# Patient Record
Sex: FEMALE | Race: WHITE | Employment: FULL TIME | URBAN - METROPOLITAN AREA
[De-identification: names, ages, dates, MRNs, and addresses within clinical notes are randomized per-mention and may not be internally consistent; named-entity substitution may affect disease eponyms.]

---

## 2017-04-07 ENCOUNTER — EMPLOYEE WELLNESS (OUTPATIENT)
Dept: OTHER | Age: 27
End: 2017-04-07

## 2017-04-07 LAB
CHOLESTEROL/HDL RATIO: 2.4
CHOLESTEROL: 180 MG/DL
GLUCOSE BLD-MCNC: 96 MG/DL (ref 70–99)
HDLC SERPL-MCNC: 76 MG/DL
LDL CHOLESTEROL: 95 MG/DL (ref 0–130)
PATIENT FASTING?: YES
TRIGL SERPL-MCNC: 46 MG/DL
VLDLC SERPL CALC-MCNC: NORMAL MG/DL (ref 1–30)

## 2017-07-13 ENCOUNTER — HOSPITAL ENCOUNTER (OUTPATIENT)
Age: 27
Setting detail: SPECIMEN
Discharge: HOME | End: 2017-07-13
Payer: COMMERCIAL

## 2017-07-16 LAB
CULTURE: ABNORMAL
DIRECT EXAM: ABNORMAL
Lab: ABNORMAL
ORGANISM: ABNORMAL
ORGANISM: ABNORMAL
SPECIMEN DESCRIPTION: ABNORMAL
STATUS: ABNORMAL

## 2018-03-20 VITALS — WEIGHT: 191 LBS

## 2018-05-14 ENCOUNTER — EMPLOYEE WELLNESS (OUTPATIENT)
Dept: OTHER | Age: 28
End: 2018-05-14

## 2018-05-14 LAB
CHOLESTEROL/HDL RATIO: 2.6
CHOLESTEROL: 153 MG/DL
GLUCOSE BLD-MCNC: 90 MG/DL (ref 70–99)
HDLC SERPL-MCNC: 60 MG/DL
LDL CHOLESTEROL: 79 MG/DL (ref 0–130)
PATIENT FASTING?: YES
TRIGL SERPL-MCNC: 68 MG/DL
VLDLC SERPL CALC-MCNC: NORMAL MG/DL (ref 1–30)

## 2018-05-21 VITALS — WEIGHT: 172 LBS

## 2018-07-03 ENCOUNTER — OFFICE VISIT (OUTPATIENT)
Dept: FAMILY MEDICINE CLINIC | Age: 28
End: 2018-07-03
Payer: COMMERCIAL

## 2018-07-03 VITALS
SYSTOLIC BLOOD PRESSURE: 104 MMHG | HEIGHT: 71 IN | BODY MASS INDEX: 24.22 KG/M2 | HEART RATE: 101 BPM | WEIGHT: 173 LBS | DIASTOLIC BLOOD PRESSURE: 64 MMHG | OXYGEN SATURATION: 98 % | TEMPERATURE: 98 F

## 2018-07-03 DIAGNOSIS — J02.0 STREP THROAT: Primary | ICD-10-CM

## 2018-07-03 DIAGNOSIS — J02.9 SORE THROAT: ICD-10-CM

## 2018-07-03 LAB — S PYO AG THROAT QL: POSITIVE

## 2018-07-03 PROCEDURE — G8420 CALC BMI NORM PARAMETERS: HCPCS | Performed by: NURSE PRACTITIONER

## 2018-07-03 PROCEDURE — 1036F TOBACCO NON-USER: CPT | Performed by: NURSE PRACTITIONER

## 2018-07-03 PROCEDURE — G8427 DOCREV CUR MEDS BY ELIG CLIN: HCPCS | Performed by: NURSE PRACTITIONER

## 2018-07-03 PROCEDURE — 99201 PR OFFICE OUTPATIENT NEW 10 MINUTES: CPT | Performed by: NURSE PRACTITIONER

## 2018-07-03 PROCEDURE — 87880 STREP A ASSAY W/OPTIC: CPT | Performed by: NURSE PRACTITIONER

## 2018-07-03 RX ORDER — AMOXICILLIN 500 MG/1
500 CAPSULE ORAL 3 TIMES DAILY
Qty: 30 CAPSULE | Refills: 0 | Status: SHIPPED | OUTPATIENT
Start: 2018-07-03 | End: 2019-05-09 | Stop reason: SDUPTHER

## 2018-07-03 ASSESSMENT — PATIENT HEALTH QUESTIONNAIRE - PHQ9
1. LITTLE INTEREST OR PLEASURE IN DOING THINGS: 0
SUM OF ALL RESPONSES TO PHQ QUESTIONS 1-9: 0
2. FEELING DOWN, DEPRESSED OR HOPELESS: 0
SUM OF ALL RESPONSES TO PHQ9 QUESTIONS 1 & 2: 0

## 2018-07-03 ASSESSMENT — ENCOUNTER SYMPTOMS
COUGH: 0
VOMITING: 0
NAUSEA: 0
DIARRHEA: 0
SWOLLEN GLANDS: 1
SORE THROAT: 1
ABDOMINAL PAIN: 0

## 2018-07-03 NOTE — PROGRESS NOTES
[facility]  [address] [address]  Dept: [phone]  Dept Fax: [phone]    Simone Coppola is a 34 y.o. female who presents to the urgent care today for her medical conditions/complaints as noted below. Simone Coppola is c/o of Pharyngitis (started yesterday with chills, sweats and ha)      HPI:     Sore throat yesterday  Headache, chills  Denies uri      Pharyngitis   This is a new problem. The current episode started yesterday. The problem occurs constantly. The problem has been waxing and waning. Associated symptoms include chills, headaches, a sore throat and swollen glands. Pertinent negatives include no abdominal pain, congestion, coughing, nausea, rash or vomiting. The symptoms are aggravated by swallowing. She has tried NSAIDs for the symptoms. The treatment provided mild relief. History reviewed. No pertinent past medical history. Current Outpatient Prescriptions   Medication Sig Dispense Refill    amoxicillin (AMOXIL) 500 MG capsule Take 1 capsule by mouth 3 times daily for 10 days 30 capsule 0     No current facility-administered medications for this visit. No Known Allergies    Subjective:      Review of Systems   Constitutional: Positive for chills. HENT: Positive for sore throat. Negative for congestion. Respiratory: Negative for cough. Gastrointestinal: Negative for abdominal pain, diarrhea, nausea and vomiting. Skin: Negative for rash. Neurological: Positive for headaches. All other systems reviewed and are negative. Objective:     Physical Exam   Constitutional: She is oriented to person, place, and time. She appears well-developed and well-nourished. No distress. HENT:   Head: Normocephalic and atraumatic. Right Ear: Tympanic membrane normal.   Left Ear: Tympanic membrane normal.   Nose: Nose normal.   Mouth/Throat: Mucous membranes are normal. No trismus in the jaw.  No uvula instructions. Discussed use, benefit, and side effects of prescribed medications. All patient questions answered. Pt voiced understanding.     Electronically signed by SONIA Corado CNP on 7/3/2018 at 6:07 PM

## 2018-09-17 ENCOUNTER — HOSPITAL ENCOUNTER (OUTPATIENT)
Age: 28
Setting detail: SPECIMEN
Discharge: HOME | End: 2018-09-17
Payer: COMMERCIAL

## 2018-09-17 ENCOUNTER — OFFICE VISIT (OUTPATIENT)
Dept: OBGYN CLINIC | Age: 28
End: 2018-09-17
Payer: COMMERCIAL

## 2018-09-17 VITALS
BODY MASS INDEX: 24.42 KG/M2 | SYSTOLIC BLOOD PRESSURE: 110 MMHG | HEIGHT: 71 IN | DIASTOLIC BLOOD PRESSURE: 72 MMHG | WEIGHT: 174.4 LBS

## 2018-09-17 DIAGNOSIS — Z01.419 ENCOUNTER FOR ANNUAL ROUTINE GYNECOLOGICAL EXAMINATION: Primary | ICD-10-CM

## 2018-09-17 PROCEDURE — 99385 PREV VISIT NEW AGE 18-39: CPT | Performed by: OBSTETRICS & GYNECOLOGY

## 2018-09-18 NOTE — PROGRESS NOTES
Discharge  Musculoskeletal ROS: No Arthralgia, Arthritis,Gout,Osteoporosis or Rheumatism  Neurological ROS: No CVA, Migraines, Epilepsy, Seizure Hx, or Limb Weakness  Dermatological ROS: No Rash, Itching, Hives, Mole Changes or Cancer                                                                                                                                                                                                                                  PHYSICAL Exam:     Constitutional:  Vitals:    09/17/18 1422   BP: 110/72   Site: Right Upper Arm   Position: Sitting   Cuff Size: Medium Adult   Weight: 174 lb 6.4 oz (79.1 kg)   Height: 5' 11\" (1.803 m)         General Appearance: This  is a well Developed, well Nourished, well groomed female. Her BMI was reviewed. Nutritional decision making was discussed. Skin:  There was a Normal Inspection of the skin without rashes or lesions. There were no rashes. Lymphatic:  No Lymph Nodes were Palpable in the neck , axilla or groin.  0 # Of Lymph Nodes    Neck and EENT:  The neck was supple. There were no masses   The thyroid was not enlarged and had no masses. EOMI B/L      Respiratory: The lungs were auscultated and found to be clear. There were no rales, rhonchi or wheezes. There was a good respiratory effort. Cardiovascular: The heart was in a regular rate and rhythm. . No S3 or S4. There was no murmur appreciated. Location, grade, and radiation are not applicable. Extremities: The patients extremities were without calf tenderness, edema, or varicosities. There was full range of motion in all four extremities. Pulses in all four extremities were appreciated and are 2/4. Abdomen: The abdomen was soft and non-tender. There were good bowel sounds in all quadrants and there was no guarding, rebound or rigidity. Abdominal Scars: none    Psych:   The patient had a normal Orientation to: Time, Place, Person, and Situation  There is no Mood / Affect changes    Breast:  (Chest)  normal appearance, no masses or tenderness  Self breast exams were reviewed in detail. Literature was given. Pelvic Exam:  Vulva and vagina appear normal. Bimanual exam reveals normal uterus and adnexa. Rectal Exam:  exam declined by patient          Musculosk:  Normal Gait and station was noted. Digits were evaluated without abnormal findings. Range of motion, stability and strength were evaluated and found to be appropriate for the patients age. OMM Structural Component:  The patient did not complain of a Chief complaint requiring OMM. Chief Complaint:none    Structural Exam: Refused    ASSESSMENT:      34 y.o. Annual   Diagnosis Orders   1. Encounter for annual routine gynecological examination  PAP Smear          Chief Complaint   Patient presents with    Gynecologic Exam          History reviewed. No pertinent past medical history. There are no active problems to display for this patient. Hereditary Breast, Ovarian, Colon and Uterine Cancer screening Done. Tobacco & Secondary smoke risks reviewed; instructed on cessation and avoidance      Counseling Completed:  Preventative Health Recommendations and Follow up. PLAN:  Return if symptoms worsen or fail to improve, for annual.  Repeat Annual every 1 year  Cervical Cytology Evaluation begins at 24years old. If Negative Cytology, Follow-up screening per current guidelines. Multivitamin and folic acid  Birth control and barrier recommendations discussed. STD counseling and prevention reviewed. Routine health maintenance per patients PCP. Orders Placed This Encounter   Procedures    PAP Smear     Patient History:    Patient's last menstrual period was 09/03/2018.   OBGYN Status: Having periods  Past Surgical History:  No date: WISDOM TOOTH EXTRACTION      Smoking status: Never Smoker                                                              Smokeless tobacco: Never Used Standing Status:   Future     Standing Expiration Date:   9/17/2019     Order Specific Question:   Collection Type     Answer: Thin Prep     Order Specific Question:   Prior Abnormal Pap Test     Answer:   No     Order Specific Question:   Screening or Diagnostic     Answer:   Screening     Order Specific Question:   HPV Requested?      Answer:   Yes - If Abnormal Reflex HPV     Order Specific Question:   High Risk Patient     Answer:   N/A

## 2018-10-01 LAB — CYTOLOGY REPORT: NORMAL

## 2019-05-09 ENCOUNTER — OFFICE VISIT (OUTPATIENT)
Dept: FAMILY MEDICINE CLINIC | Age: 29
End: 2019-05-09
Payer: COMMERCIAL

## 2019-05-09 VITALS
HEART RATE: 78 BPM | OXYGEN SATURATION: 99 % | TEMPERATURE: 98.3 F | SYSTOLIC BLOOD PRESSURE: 110 MMHG | DIASTOLIC BLOOD PRESSURE: 80 MMHG

## 2019-05-09 DIAGNOSIS — J02.9 SORE THROAT: ICD-10-CM

## 2019-05-09 DIAGNOSIS — R07.89 CHEST TIGHTNESS: ICD-10-CM

## 2019-05-09 DIAGNOSIS — J02.0 STREP THROAT: Primary | ICD-10-CM

## 2019-05-09 LAB — S PYO AG THROAT QL: POSITIVE

## 2019-05-09 PROCEDURE — G8420 CALC BMI NORM PARAMETERS: HCPCS | Performed by: NURSE PRACTITIONER

## 2019-05-09 PROCEDURE — 87880 STREP A ASSAY W/OPTIC: CPT | Performed by: NURSE PRACTITIONER

## 2019-05-09 PROCEDURE — 99213 OFFICE O/P EST LOW 20 MIN: CPT | Performed by: NURSE PRACTITIONER

## 2019-05-09 PROCEDURE — 1036F TOBACCO NON-USER: CPT | Performed by: NURSE PRACTITIONER

## 2019-05-09 PROCEDURE — G8427 DOCREV CUR MEDS BY ELIG CLIN: HCPCS | Performed by: NURSE PRACTITIONER

## 2019-05-09 RX ORDER — PREDNISONE 20 MG/1
40 TABLET ORAL DAILY
Qty: 10 TABLET | Refills: 0 | Status: SHIPPED | OUTPATIENT
Start: 2019-05-09 | End: 2019-05-14 | Stop reason: ALTCHOICE

## 2019-05-09 RX ORDER — AMOXICILLIN 500 MG/1
500 CAPSULE ORAL 3 TIMES DAILY
Qty: 30 CAPSULE | Refills: 0 | Status: SHIPPED | OUTPATIENT
Start: 2019-05-09 | End: 2019-05-14 | Stop reason: ALTCHOICE

## 2019-05-09 ASSESSMENT — ENCOUNTER SYMPTOMS
RHINORRHEA: 1
COUGH: 1
CHEST TIGHTNESS: 1
WHEEZING: 0
SORE THROAT: 1
SHORTNESS OF BREATH: 0
EYE DISCHARGE: 0
EYE REDNESS: 0
SINUS PRESSURE: 0
VOICE CHANGE: 0

## 2019-05-09 NOTE — PROGRESS NOTES
[facility]  [address] [address]  Dept: [phone]  Dept Fax: [phone]     Danya Leo is a 34 y.o. female who presents to the urgent care today for her medicalconditions/complaints as noted below. Danya Leo is c/o of Chest Congestion (sore throat, runny nose and intermittent fever - started on Tuesday night)    HPI:      Cough   This is a new problem. Episode onset: 2 days ago. The problem has been waxing and waning. The cough is non-productive. Associated symptoms include nasal congestion, rhinorrhea and a sore throat. Pertinent negatives include no chest pain, chills, ear pain, eye redness, fever, headaches, myalgias, postnasal drip, rash, shortness of breath or wheezing. Treatments tried: claritin. The treatment provided no relief. No past medical history on file. Current Outpatient Medications   Medication Sig Dispense Refill    predniSONE (DELTASONE) 20 MG tablet Take 2 tablets by mouth daily for 5 days 10 tablet 0    amoxicillin (AMOXIL) 500 MG capsule Take 1 capsule by mouth 3 times daily for 10 days 30 capsule 0     No current facility-administered medications for this visit. No Known Allergies    Reviewed PMH, SH, and FH with the patient and updated. Subjective:      Review of Systems   Constitutional: Negative for chills, fatigue and fever. HENT: Positive for congestion, rhinorrhea and sore throat. Negative for ear discharge, ear pain, postnasal drip, sinus pressure, sneezing and voice change. Eyes: Negative for discharge and redness. Respiratory: Positive for cough and chest tightness. Negative for shortness of breath and wheezing. Cardiovascular: Negative. Negative for chest pain. Musculoskeletal: Negative for myalgias. Skin: Negative for rash. Neurological: Negative for dizziness, weakness, light-headedness and headaches. Hematological: Negative for adenopathy.    All other systems reviewed and are negative. Objective:      Physical Exam   Constitutional: She appears well-developed and well-nourished. No distress. HENT:   Head: Normocephalic. Right Ear: Tympanic membrane and external ear normal.   Left Ear: Tympanic membrane and external ear normal.   Nose: Nose normal. Right sinus exhibits no maxillary sinus tenderness and no frontal sinus tenderness. Left sinus exhibits no maxillary sinus tenderness and no frontal sinus tenderness. Mouth/Throat: Posterior oropharyngeal erythema present. No oropharyngeal exudate. Eyes: Right eye exhibits no discharge. Left eye exhibits no discharge. Cardiovascular: Normal rate, regular rhythm and normal heart sounds. No murmur heard. Pulmonary/Chest: Effort normal and breath sounds normal. No respiratory distress. She has no wheezes. She has no rales. Lymphadenopathy:     She has cervical adenopathy. Skin: Skin is warm. No rash noted. She is not diaphoretic. Nursing note and vitals reviewed. /80 (Site: Right Upper Arm, Position: Sitting, Cuff Size: Medium Adult)   Pulse 78   Temp 98.3 °F (36.8 °C) (Oral)   LMP 04/10/2019 (Exact Date)   SpO2 99%     Results for orders placed or performed in visit on 05/09/19   POCT rapid strep A   Result Value Ref Range    Strep A Ag Positive (A) None Detected     Assessment:       Diagnosis Orders   1. Strep throat  amoxicillin (AMOXIL) 500 MG capsule   2. Sore throat  POCT rapid strep A    predniSONE (DELTASONE) 20 MG tablet   3. Chest tightness  predniSONE (DELTASONE) 20 MG tablet     Plan:      Patient instructed to complete entire antibiotic course. Prednisone sent to the pharmacy to help enable symptom relief. Tylenol as needed for fever/discomfort. Change toothbrush in 24 hours. Salt water gargles and throat lozenges if desired. Patient agreeable to treatment plan. Educational materials provided on AVS.  Follow up if symptoms do not improve/worsen.     Orders Placed This Encounter   Medications    predniSONE (DELTASONE) 20 MG tablet     Sig: Take 2 tablets by mouth daily for 5 days     Dispense:  10 tablet     Refill:  0    amoxicillin (AMOXIL) 500 MG capsule     Sig: Take 1 capsule by mouth 3 times daily for 10 days     Dispense:  30 capsule     Refill:  0        Patient given educational materials - see patient instructions. Discussed use, benefit, and side effects of prescribed medications. All patientquestions answered. Pt voiced understanding.     Electronically signed by SONIA Ruvalcaba CNP on 5/9/2019at 3:30 PM

## 2019-05-09 NOTE — PATIENT INSTRUCTIONS
Patient Education        Strep Throat: Care Instructions  Your Care Instructions    Strep throat is a bacterial infection that causes sudden, severe sore throat and fever. Strep throat, which is caused by bacteria called streptococcus, is treated with antibiotics. Sometimes a strep test is necessary to tell if the sore throat is caused by strep bacteria. Treatment can help ease symptoms and may prevent future problems. Follow-up care is a key part of your treatment and safety. Be sure to make and go to all appointments, and call your doctor if you are having problems. It's also a good idea to know your test results and keep a list of the medicines you take. How can you care for yourself at home? · Take your antibiotics as directed. Do not stop taking them just because you feel better. You need to take the full course of antibiotics. · Strep throat can spread to others until 24 hours after you begin taking antibiotics. During this time, you should avoid contact with other people at work or home, especially infants and children. Do not sneeze or cough on others, and wash your hands often. Keep your drinking glass and eating utensils separate from those of others, and wash these items well in hot, soapy water. · Gargle with warm salt water at least once each hour to help reduce swelling and make your throat feel better. Use 1 teaspoon of salt mixed in 8 fluid ounces of warm water. · Take an over-the-counter pain medication, such as acetaminophen (Tylenol), ibuprofen (Advil, Motrin), or naproxen (Aleve). Read and follow all instructions on the label. · Try an over-the-counter anesthetic throat spray or throat lozenges, which may help relieve throat pain. · Drink plenty of fluids. Fluids may help soothe an irritated throat. Hot fluids, such as tea or soup, may help your throat feel better. · Eat soft solids and drink plenty of clear liquids.  Flavored ice pops, ice cream, scrambled eggs, sherbet, and gelatin dessert (such as Jell-O) may also soothe the throat. · Get lots of rest.  · Do not smoke, and avoid secondhand smoke. If you need help quitting, talk to your doctor about stop-smoking programs and medicines. These can increase your chances of quitting for good. · Use a vaporizer or humidifier to add moisture to the air in your bedroom. Follow the directions for cleaning the machine. When should you call for help? Call your doctor now or seek immediate medical care if:    · You have a new or higher fever.     · You have a fever with a stiff neck or severe headache.     · You have new or worse trouble swallowing.     · Your sore throat gets much worse on one side.     · Your pain becomes much worse on one side of your throat.    Watch closely for changes in your health, and be sure to contact your doctor if:    · You are not getting better after 2 days (48 hours).     · You do not get better as expected. Where can you learn more? Go to https://[employer]. org and sign in to your [employer] account. Enter K625 in the FNZ box to learn more about \"Strep Throat: Care Instructions. \"     If you do not have an account, please click on the \"Sign Up Now\" link. Current as of: October 21, 2018  Content Version: 12.0  © 1808-3924 [employer], [employer]orporated. Care instructions adapted under license by [facility] ([facility]). If you have questions about a medical condition or this instruction, always ask your healthcare professional. [place] [address] any warranty or liability for your use of this information.

## 2019-05-14 ENCOUNTER — OFFICE VISIT (OUTPATIENT)
Dept: FAMILY MEDICINE CLINIC | Age: 29
End: 2019-05-14
Payer: COMMERCIAL

## 2019-05-14 VITALS
DIASTOLIC BLOOD PRESSURE: 70 MMHG | HEIGHT: 71 IN | BODY MASS INDEX: 25.03 KG/M2 | WEIGHT: 178.8 LBS | OXYGEN SATURATION: 98 % | HEART RATE: 62 BPM | SYSTOLIC BLOOD PRESSURE: 112 MMHG

## 2019-05-14 DIAGNOSIS — Z00.00 PHYSICAL EXAM: Primary | ICD-10-CM

## 2019-05-14 PROCEDURE — 99395 PREV VISIT EST AGE 18-39: CPT | Performed by: INTERNAL MEDICINE

## 2019-05-14 ASSESSMENT — ENCOUNTER SYMPTOMS
NAUSEA: 0
SHORTNESS OF BREATH: 0
CONSTIPATION: 0
CHEST TIGHTNESS: 0
COUGH: 0
BACK PAIN: 0
SORE THROAT: 0
ABDOMINAL PAIN: 0
TROUBLE SWALLOWING: 0
STRIDOR: 0
RECTAL PAIN: 0
VOICE CHANGE: 0
DIARRHEA: 0
CHOKING: 0
ANAL BLEEDING: 0

## 2019-05-14 ASSESSMENT — PATIENT HEALTH QUESTIONNAIRE - PHQ9
SUM OF ALL RESPONSES TO PHQ QUESTIONS 1-9: 0
SUM OF ALL RESPONSES TO PHQ9 QUESTIONS 1 & 2: 0
SUM OF ALL RESPONSES TO PHQ QUESTIONS 1-9: 0
2. FEELING DOWN, DEPRESSED OR HOPELESS: 0
1. LITTLE INTEREST OR PLEASURE IN DOING THINGS: 0

## 2019-05-14 NOTE — PROGRESS NOTES
Visit Information    Have you changed or started any medications since your last visit including any over-the-counter medicines, vitamins, or herbal medicines? no   Have you stopped taking any of your medications? Is so, why? -  no  Are you having any side effects from any of your medications? - no    Have you seen any other physician or provider since your last visit?  no   Have you had any other diagnostic tests since your last visit?  no   Have you been seen in the emergency room and/or had an admission in a hospital since we last saw you?  no   Have you had your routine dental cleaning in the past 6 months?  no     Do you have an active MyChart account? If no, what is the barrier?   Yes    Patient Care Team:  Neelima Galarza DO as PCP - General (Family Medicine)  SONIA Salinas - CNP as PCP - S Attributed Provider    Medical History Review  Past Medical, Family, and Social History reviewed and does contribute to the patient presenting condition    Health Maintenance   Topic Date Due    Varicella Vaccine (1 of 2 - 13+ 2-dose series) 05/23/2005    HPV vaccine (1 - Female 3-dose series) 05/23/2007    HIV screen  05/23/2007    DTaP/Tdap/Td vaccine (1 - Tdap) 05/23/2011    Cervical cancer screen  09/17/2021    Flu vaccine  Completed    Pneumococcal 0-64 years Vaccine  Aged Out

## 2019-05-14 NOTE — PROGRESS NOTES
[address] [facility] [address]  Dept: [phone]  Dept Fax: [phone]    Carlene Mercedes a 34 y.o. female who presents today for her medical conditions/complaints as notedbelow. Thuy Fernandez is c/o of Establish Care (Patient is here to establish care)      HPI:     Here to establish care   No chronic medical conditions    is nurse at [employer]     No medications daily   Is up to date on tetanus but does not know last one   Cannot pull up [facility] records in care everywhere   Last pcp was there   Last titer for vaccines were normal   Last lipid /cmp in 2018 was good   No real family hx per patient   Does not smoke   No occasional etoh  Up to date on pap , did have hpv vaccine at 17   No abnormal pap   Normal periods , not on birth control     Sleeps well at night         Social History     Tobacco Use    Smoking status: Never Smoker    Smokeless tobacco: Never Used   Substance Use Topics    Alcohol use: No      No current outpatient medications on file. No current facility-administered medications for this visit. No Known Allergies      Subjective:     Review of Systems   Constitutional: Negative for activity change, appetite change, chills, diaphoresis, fatigue, fever and unexpected weight change. HENT: Negative for sore throat, tinnitus, trouble swallowing and voice change. Eyes: Negative for visual disturbance. Respiratory: Negative for cough, choking, chest tightness, shortness of breath and stridor. Cardiovascular: Negative for chest pain and leg swelling. Gastrointestinal: Negative for abdominal pain, anal bleeding, constipation, diarrhea, nausea and rectal pain. Endocrine: Negative for cold intolerance and heat intolerance. Genitourinary: Negative for menstrual problem and pelvic pain. Musculoskeletal: Negative for arthralgias, back pain and gait problem. Skin: Negative for rash. Allergic/Immunologic: Negative for immunocompromised state. Neurological: Negative for dizziness and headaches. Hematological: Negative for adenopathy. Does not bruise/bleed easily. Psychiatric/Behavioral: Negative for confusion and sleep disturbance. The patient is not nervous/anxious. Objective:      Physical Exam   Constitutional: She is oriented to person, place, and time. She appears well-developed and well-nourished. Non-toxic appearance. She does not have a sickly appearance. She does not appear ill. No distress. She is not intubated. HENT:   Head: Normocephalic and atraumatic. Right Ear: External ear normal.   Left Ear: External ear normal.   Nose: Nose normal.   Mouth/Throat: Uvula is midline, oropharynx is clear and moist and mucous membranes are normal. No tonsillar exudate. Eyes: Pupils are equal, round, and reactive to light. EOM are normal.   Neck: Trachea normal, full passive range of motion without pain and phonation normal. Neck supple. No JVD present. Carotid bruit is not present. No thyromegaly present. Cardiovascular: Normal rate, regular rhythm, S1 normal, S2 normal, normal heart sounds and intact distal pulses. Occasional extrasystoles are present. PMI is not displaced. No murmur heard. Pulmonary/Chest: Effort normal and breath sounds normal. No accessory muscle usage or stridor. No apnea, no tachypnea and no bradypnea. She is not intubated. No respiratory distress. She has no decreased breath sounds. She has no wheezes. Abdominal: Soft. Bowel sounds are normal. There is no splenomegaly or hepatomegaly. There is no tenderness. Lymphadenopathy:     She has no cervical adenopathy. Neurological: She is alert and oriented to person, place, and time. No cranial nerve deficit. Skin: Skin is warm and dry. No rash noted. Psychiatric: She has a normal mood and affect.  Her speech is normal and behavior is normal. Judgment normal. Thought content is not delusional. Cognition and memory are normal. She expresses no suicidal plans and no homicidal plans. Nursing note and vitals reviewed. /70   Pulse 62   Ht 5' 11\" (1.803 m)   Wt 178 lb 12.8 oz (81.1 kg)   LMP 04/10/2019 (Exact Date)   SpO2 98%   BMI 24.94 kg/m²     Assessment:          Diagnosis Orders   1. Physical exam         Plan:      Return if symptoms worsen or fail to improve. No orders of the defined types were placed in this encounter. No orders of the defined types were placed in this encounter. Findings and/or pathophysiology discussedwith patient. Plan of treatment discussed. Chart was evaluated. Availablelab work, tests and notes were discussed. Health maintenance was discussed. Diet,exercise, reduction in weight and salt was discussed. Range of motion exerciseswere discussed. Discussed use, benefit, and side effects of prescribed medications. All patient questions answered. Pt voiced understanding. Instructed to continuecurrent medications, diet and exercise. Patient agreed with treatment plan. Followup as directed. Patient given educational materials - see patient instructions.     Electronicallysigned by Nereyda Miramontes DO on 5/14/2019 at 10:17 AM

## 2019-07-01 ENCOUNTER — TELEPHONE (OUTPATIENT)
Dept: OBGYN CLINIC | Age: 29
End: 2019-07-01

## 2019-07-11 ENCOUNTER — INITIAL PRENATAL (OUTPATIENT)
Dept: OBGYN CLINIC | Age: 29
End: 2019-07-11
Payer: COMMERCIAL

## 2019-07-11 ENCOUNTER — HOSPITAL ENCOUNTER (OUTPATIENT)
Age: 29
Setting detail: SPECIMEN
Discharge: HOME | End: 2019-07-11
Payer: COMMERCIAL

## 2019-07-11 VITALS — BODY MASS INDEX: 24.48 KG/M2 | SYSTOLIC BLOOD PRESSURE: 112 MMHG | DIASTOLIC BLOOD PRESSURE: 64 MMHG | WEIGHT: 175.5 LBS

## 2019-07-11 DIAGNOSIS — Z3A.08 8 WEEKS GESTATION OF PREGNANCY: ICD-10-CM

## 2019-07-11 DIAGNOSIS — Z34.91 NORMAL PREGNANCY IN FIRST TRIMESTER: ICD-10-CM

## 2019-07-11 DIAGNOSIS — Z34.91 NORMAL PREGNANCY IN FIRST TRIMESTER: Primary | ICD-10-CM

## 2019-07-11 LAB
-: ABNORMAL
AMORPHOUS: ABNORMAL
BACTERIA: ABNORMAL
BILIRUBIN URINE: NEGATIVE
CASTS UA: ABNORMAL /LPF (ref 0–2)
COLOR: YELLOW
COMMENT UA: ABNORMAL
CRYSTALS, UA: ABNORMAL /HPF
CRYSTALS, UA: ABNORMAL /HPF
DIRECT EXAM: NORMAL
EPITHELIAL CELLS UA: ABNORMAL /HPF (ref 0–5)
GLUCOSE URINE: NEGATIVE
KETONES, URINE: NEGATIVE
LEUKOCYTE ESTERASE, URINE: NEGATIVE
Lab: NORMAL
MUCUS: ABNORMAL
NITRITE, URINE: NEGATIVE
OTHER OBSERVATIONS UA: ABNORMAL
PH UA: 7 (ref 5–8)
PROTEIN UA: NEGATIVE
RBC UA: ABNORMAL /HPF (ref 0–2)
RENAL EPITHELIAL, UA: ABNORMAL /HPF
SPECIFIC GRAVITY UA: 1.03 (ref 1–1.03)
SPECIMEN DESCRIPTION: NORMAL
TRICHOMONAS: ABNORMAL
TURBIDITY: ABNORMAL
URINE HGB: NEGATIVE
UROBILINOGEN, URINE: NORMAL
WBC UA: ABNORMAL /HPF (ref 0–5)
YEAST: ABNORMAL

## 2019-07-11 PROCEDURE — 0500F INITIAL PRENATAL CARE VISIT: CPT | Performed by: NURSE PRACTITIONER

## 2019-07-11 NOTE — PATIENT INSTRUCTIONS
Patient Education      Patient Education      Patient Education      Patient Education     Patient Education     After Hours Number: You can call the office [phone]  or [phone]  Call if you have:  1. Bleeding like a period  2. Cramps or contractions greater than 2 hours  3. If you are leaking fluid  4. If you've a fever greater than 100°  5. If you feel as if baby is not moving  6. If you have continuous vomiting over 3-4 hours        Patient was counseled on weight gain in pregnancy with the following recommendation made based on her BMI:     Singletons:  BMI <18.5: 28-40 lbs weight gain  BMI 18.5-24.9: 25-35 lbs weight gain   BMI 25-29.9: 15-25 lbs weight gain  BMI >/= 30: 11-20 lbs weight gain    Up to 16 weeks around 1 pound a month  16-28 weeks- 2-4 pounds a month  >28 weeks - around 1 pound a week due to increased growth and blood volume      Twins:  BMI <18.5: no reccomendations  BMI 18.5-24.9: 37-45 lbs weight gain  BMI 25-29.9: 31-50 lbs weight gain  BMI >/= 30: 25-42 lbs weight gain    Exercise During Pregnancy: Care Instructions  Your Care Instructions    Exercise is good for healthy pregnant women. It can relieve back pain, swelling, and other discomforts of pregnancy. It also prepares your muscles for childbirth. And exercise can improve your energy level and help you sleep better. If your doctor recommends it, get more exercise. Walking is a good choice. Bit by bit, increase the amount you walk every day. Try for at least 30 minutes on most days of the week. But if you do not already exercise, be sure to talk with your doctor before you start a new exercise program. Try exercise classes for pregnant women. Doctors do not recommend contact sports during pregnancy. Follow-up care is a key part of your treatment and safety. Be sure to make and go to all appointments, and call your doctor if you are having problems.  It's also a good idea to know your test results and keep a list of the common positions is the cradle hold. One arm supports your baby, with his or her head in the bend of your elbow. Your open hand supports your baby's bottom or back. Your baby's belly lies against yours. ? If you had your baby by , or , try the football hold. This position keeps your baby off your belly. Tuck your baby under your arm, with his or her body along the side you will be feeding on. Support your baby's upper body with your arm. With that hand you can control your baby's head to bring his or her mouth to your breast.  ? Try different positions with each feeding. If you are having problems, ask for help from your doctor or a lactation consultant. · To get your baby to latch on:  ? Support and narrow your breast with one hand using a \"U hold,\" with your thumb on the outer side of your breast and your fingers on the inner side. You can also use a \"C hold,\" with all your fingers below the nipple and your thumb above it. Try the different holds to get the deepest latch for whichever breastfeeding position you use. Your other arm is behind your baby's back, with your hand supporting the base of the baby's head. Position your fingers and thumb to point toward your baby's ears. ? You can touch your baby's lower lip with your nipple to get your baby to open his or her mouth. Wait until your baby opens up really wide, like a big yawn. Then be sure to bring the baby quickly to your breast--not your breast to the baby. As you bring your baby toward your breast, use your other hand to support the breast and guide it into his or her mouth. ? Both the nipple and a large portion of the darker area around the nipple (areola) should be in the baby's mouth. The baby's lips should be flared outward, not folded in (inverted). ? Listen for a regular sucking and swallowing pattern while the baby is feeding.  If you cannot see or hear a swallowing pattern, watch the baby's ears, which will wiggle slightly when concerns. Where can you learn more? Go to [email or web address] and sign in to your [employer] account. Enter P492 in the [employer] box to learn more about \"Breastfeeding: Care Instructions. \"     If you do not have an account, please click on the \"Sign Up Now\" link. Current as of: September 5, 2018  Content Version: 12.0  © 4286-9380 [employer]. Care instructions adapted under license by [facility] ([facility]). If you have questions about a medical condition or this instruction, always ask your healthcare professional. [address] any warranty or liability for your use of this information. Learning About Birth Defects Testing  What is birth defects testing? Birth defects testing is done during pregnancy to look for possible problems with the baby (fetus). A birth defect may have only a minor impact on a child's life. Or it can have a major effect on quality or length of life. You and your doctor can choose from many tests. You may have no tests, one test, or many tests. Talking with a genetic counselor may help you make decisions about testing. The counselor is trained to help you understand these tests. He or she can also help you find resources for support. What are the types of tests? You may have a screening test or a diagnostic test. Or you may have both types of tests. Screening tests show the chance that a baby has a certain birth defect, such as Down syndrome, spina bifida, or trisomy 25. Diagnostic tests show if a baby has a certain birth defect. · Screening tests and when they are done:  ? Blood tests at 10 to 13 weeks (first trimester)  ? Cell free fetal DNA test at 10 weeks or later (first trimester)  ? Nuchal translucency test at 11 to 14 weeks (first trimester)  ? Triple or quad screening at 15 to 20 weeks (second trimester)  ?  Ultrasound at 18 to 20 weeks (second trimester)  · Diagnostic tests and when they are

## 2019-07-12 LAB
C TRACH DNA GENITAL QL NAA+PROBE: NEGATIVE
N. GONORRHOEAE DNA: NEGATIVE
SPECIMEN DESCRIPTION: NORMAL

## 2019-07-13 LAB
CULTURE: NO GROWTH
Lab: NORMAL
SPECIMEN DESCRIPTION: NORMAL

## 2019-07-14 LAB
CULTURE: NORMAL
Lab: NORMAL
SPECIMEN DESCRIPTION: NORMAL

## 2019-07-15 ENCOUNTER — HOSPITAL ENCOUNTER (OUTPATIENT)
Age: 29
Discharge: HOME | End: 2019-07-15
Payer: COMMERCIAL

## 2019-07-15 DIAGNOSIS — Z34.91 NORMAL PREGNANCY IN FIRST TRIMESTER: ICD-10-CM

## 2019-07-15 DIAGNOSIS — Z3A.08 8 WEEKS GESTATION OF PREGNANCY: ICD-10-CM

## 2019-07-15 LAB
ABO/RH: NORMAL
ABSOLUTE EOS #: 0.09 K/UL (ref 0–0.44)
ABSOLUTE IMMATURE GRANULOCYTE: 0.03 K/UL (ref 0–0.3)
ABSOLUTE LYMPH #: 1.86 K/UL (ref 1.1–3.7)
ABSOLUTE MONO #: 0.88 K/UL (ref 0.1–1.2)
ANTIBODY SCREEN: NEGATIVE
BASOPHILS # BLD: 1 % (ref 0–2)
BASOPHILS ABSOLUTE: 0.07 K/UL (ref 0–0.2)
DIFFERENTIAL TYPE: ABNORMAL
EOSINOPHILS RELATIVE PERCENT: 1 % (ref 1–4)
GLUCOSE BLD-MCNC: 101 MG/DL (ref 70–99)
HCG QUANTITATIVE: ABNORMAL IU/L
HCT VFR BLD CALC: 39.9 % (ref 36.3–47.1)
HEMOGLOBIN: 13.2 G/DL (ref 11.9–15.1)
HEPATITIS B SURFACE ANTIGEN: NONREACTIVE
HIV AG/AB: NONREACTIVE
IMMATURE GRANULOCYTES: 0 %
LYMPHOCYTES # BLD: 20 % (ref 24–43)
MCH RBC QN AUTO: 30.3 PG (ref 25.2–33.5)
MCHC RBC AUTO-ENTMCNC: 33.1 G/DL (ref 28.4–34.8)
MCV RBC AUTO: 91.7 FL (ref 82.6–102.9)
MONOCYTES # BLD: 9 % (ref 3–12)
NRBC AUTOMATED: 0 PER 100 WBC
PDW BLD-RTO: 12.5 % (ref 11.8–14.4)
PLATELET # BLD: 208 K/UL (ref 138–453)
PLATELET ESTIMATE: ABNORMAL
PMV BLD AUTO: 11.6 FL (ref 8.1–13.5)
RBC # BLD: 4.35 M/UL (ref 3.95–5.11)
RBC # BLD: ABNORMAL 10*6/UL
RUBV IGG SER QL: 79.2 IU/ML
SEG NEUTROPHILS: 69 % (ref 36–65)
SEGMENTED NEUTROPHILS ABSOLUTE COUNT: 6.39 K/UL (ref 1.5–8.1)
T. PALLIDUM, IGG: NONREACTIVE
TSH SERPL DL<=0.05 MIU/L-ACNC: 1.12 MIU/L (ref 0.3–5)
WBC # BLD: 9.3 K/UL (ref 3.5–11.3)
WBC # BLD: ABNORMAL 10*3/UL

## 2019-07-15 PROCEDURE — 36415 COLL VENOUS BLD VENIPUNCTURE: CPT

## 2019-07-15 PROCEDURE — 84702 CHORIONIC GONADOTROPIN TEST: CPT

## 2019-07-15 PROCEDURE — 81220 CFTR GENE COM VARIANTS: CPT

## 2019-07-15 PROCEDURE — 82947 ASSAY GLUCOSE BLOOD QUANT: CPT

## 2019-07-15 PROCEDURE — 87340 HEPATITIS B SURFACE AG IA: CPT

## 2019-07-15 PROCEDURE — 85025 COMPLETE CBC W/AUTO DIFF WBC: CPT

## 2019-07-15 PROCEDURE — 86901 BLOOD TYPING SEROLOGIC RH(D): CPT

## 2019-07-15 PROCEDURE — 86780 TREPONEMA PALLIDUM: CPT

## 2019-07-15 PROCEDURE — 87389 HIV-1 AG W/HIV-1&-2 AB AG IA: CPT

## 2019-07-15 PROCEDURE — 84443 ASSAY THYROID STIM HORMONE: CPT

## 2019-07-15 PROCEDURE — 86762 RUBELLA ANTIBODY: CPT

## 2019-07-15 PROCEDURE — 86850 RBC ANTIBODY SCREEN: CPT

## 2019-07-15 PROCEDURE — 86900 BLOOD TYPING SEROLOGIC ABO: CPT

## 2019-07-22 LAB — CYSTIC FIBROSIS: NORMAL

## 2019-08-09 ENCOUNTER — ROUTINE PRENATAL (OUTPATIENT)
Dept: OBGYN CLINIC | Age: 29
End: 2019-08-09

## 2019-08-09 VITALS — DIASTOLIC BLOOD PRESSURE: 62 MMHG | BODY MASS INDEX: 23.71 KG/M2 | SYSTOLIC BLOOD PRESSURE: 110 MMHG | WEIGHT: 170 LBS

## 2019-08-09 DIAGNOSIS — Z34.91 NORMAL PREGNANCY IN FIRST TRIMESTER: Primary | ICD-10-CM

## 2019-08-09 DIAGNOSIS — Z3A.12 12 WEEKS GESTATION OF PREGNANCY: ICD-10-CM

## 2019-08-09 PROCEDURE — 0502F SUBSEQUENT PRENATAL CARE: CPT | Performed by: OBSTETRICS & GYNECOLOGY

## 2019-08-14 ENCOUNTER — PATIENT MESSAGE (OUTPATIENT)
Dept: OBGYN CLINIC | Age: 29
End: 2019-08-14

## 2019-08-14 ENCOUNTER — HOSPITAL ENCOUNTER (OUTPATIENT)
Age: 29
Discharge: HOME | End: 2019-08-14
Payer: COMMERCIAL

## 2019-08-14 DIAGNOSIS — R30.0 DYSURIA: Primary | ICD-10-CM

## 2019-08-14 PROCEDURE — 36415 COLL VENOUS BLD VENIPUNCTURE: CPT

## 2019-08-14 PROCEDURE — 87077 CULTURE AEROBIC IDENTIFY: CPT

## 2019-08-14 PROCEDURE — 81001 URINALYSIS AUTO W/SCOPE: CPT

## 2019-08-14 PROCEDURE — 87086 URINE CULTURE/COLONY COUNT: CPT

## 2019-08-14 PROCEDURE — 87186 SC STD MICRODIL/AGAR DIL: CPT

## 2019-08-15 LAB
-: ABNORMAL
AMORPHOUS: ABNORMAL
BACTERIA: ABNORMAL
BILIRUBIN URINE: NEGATIVE
CASTS UA: ABNORMAL /LPF (ref 0–8)
COLOR: YELLOW
COMMENT UA: ABNORMAL
CRYSTALS, UA: ABNORMAL /HPF
EPITHELIAL CELLS UA: ABNORMAL /HPF (ref 0–5)
GLUCOSE URINE: NEGATIVE
KETONES, URINE: NEGATIVE
LEUKOCYTE ESTERASE, URINE: ABNORMAL
MUCUS: ABNORMAL
NITRITE, URINE: NEGATIVE
OTHER OBSERVATIONS UA: ABNORMAL
PH UA: 5.5 (ref 5–8)
PROTEIN UA: NEGATIVE
RBC UA: ABNORMAL /HPF (ref 0–4)
RENAL EPITHELIAL, UA: ABNORMAL /HPF
SPECIFIC GRAVITY UA: 1.01 (ref 1–1.03)
TRICHOMONAS: ABNORMAL
TURBIDITY: CLEAR
URINE HGB: NEGATIVE
UROBILINOGEN, URINE: NORMAL
WBC UA: ABNORMAL /HPF (ref 0–5)
YEAST: ABNORMAL

## 2019-08-16 LAB
CULTURE: ABNORMAL
Lab: ABNORMAL
SPECIMEN DESCRIPTION: ABNORMAL

## 2019-08-22 ENCOUNTER — PATIENT MESSAGE (OUTPATIENT)
Dept: OBGYN CLINIC | Age: 29
End: 2019-08-22

## 2019-08-22 RX ORDER — FLUCONAZOLE 150 MG/1
150 TABLET ORAL ONCE
Qty: 1 TABLET | Refills: 0 | Status: SHIPPED | OUTPATIENT
Start: 2019-08-22 | End: 2019-08-22

## 2019-09-03 ENCOUNTER — ROUTINE PRENATAL (OUTPATIENT)
Dept: OBGYN CLINIC | Age: 29
End: 2019-09-03

## 2019-09-03 VITALS — SYSTOLIC BLOOD PRESSURE: 110 MMHG | DIASTOLIC BLOOD PRESSURE: 62 MMHG | BODY MASS INDEX: 23.71 KG/M2 | WEIGHT: 170 LBS

## 2019-09-03 DIAGNOSIS — Z34.92 NORMAL PREGNANCY IN SECOND TRIMESTER: Primary | ICD-10-CM

## 2019-09-03 DIAGNOSIS — Z3A.16 16 WEEKS GESTATION OF PREGNANCY: ICD-10-CM

## 2019-09-03 DIAGNOSIS — O23.42 URINARY TRACT INFECTION IN MOTHER DURING SECOND TRIMESTER OF PREGNANCY: ICD-10-CM

## 2019-09-03 PROCEDURE — 0502F SUBSEQUENT PRENATAL CARE: CPT | Performed by: ADVANCED PRACTICE MIDWIFE

## 2019-09-04 ENCOUNTER — HOSPITAL ENCOUNTER (OUTPATIENT)
Age: 29
Setting detail: SPECIMEN
Discharge: HOME | End: 2019-09-04
Payer: COMMERCIAL

## 2019-09-04 DIAGNOSIS — Z3A.16 16 WEEKS GESTATION OF PREGNANCY: ICD-10-CM

## 2019-09-04 DIAGNOSIS — O23.42 URINARY TRACT INFECTION IN MOTHER DURING SECOND TRIMESTER OF PREGNANCY: ICD-10-CM

## 2019-09-06 DIAGNOSIS — O23.42 RECURRENT URINARY TRACT INFECTION AFFECTING PREGNANCY IN SECOND TRIMESTER: ICD-10-CM

## 2019-09-06 DIAGNOSIS — O23.42 URINARY TRACT INFECTION IN MOTHER DURING SECOND TRIMESTER OF PREGNANCY: Primary | ICD-10-CM

## 2019-09-06 LAB
CULTURE: ABNORMAL
Lab: ABNORMAL
SPECIMEN DESCRIPTION: ABNORMAL

## 2019-09-06 RX ORDER — NITROFURANTOIN 25; 75 MG/1; MG/1
100 CAPSULE ORAL 2 TIMES DAILY
Qty: 14 CAPSULE | Refills: 0 | Status: SHIPPED | OUTPATIENT
Start: 2019-09-06 | End: 2019-09-13

## 2019-09-06 RX ORDER — NITROFURANTOIN 25; 75 MG/1; MG/1
100 CAPSULE ORAL NIGHTLY
Qty: 30 CAPSULE | Refills: 2 | Status: SHIPPED | OUTPATIENT
Start: 2019-09-06 | End: 2019-10-13 | Stop reason: SDUPTHER

## 2019-09-06 NOTE — PROGRESS NOTES
Called to inform patient of UTI. Will treat with Macrobid then place on suppression 100mg nightly. Will need rpt Urine culture at Next appt.  Pt states understanding

## 2019-10-01 ENCOUNTER — ROUTINE PRENATAL (OUTPATIENT)
Dept: PERINATAL CARE | Age: 29
End: 2019-10-01
Payer: COMMERCIAL

## 2019-10-01 VITALS
BODY MASS INDEX: 24.64 KG/M2 | WEIGHT: 176 LBS | HEART RATE: 81 BPM | SYSTOLIC BLOOD PRESSURE: 115 MMHG | DIASTOLIC BLOOD PRESSURE: 64 MMHG | TEMPERATURE: 97.9 F | HEIGHT: 71 IN | RESPIRATION RATE: 16 BRPM

## 2019-10-01 DIAGNOSIS — Z3A.20 20 WEEKS GESTATION OF PREGNANCY: ICD-10-CM

## 2019-10-01 DIAGNOSIS — Z13.89 ENCOUNTER FOR ROUTINE SCREENING FOR MALFORMATION USING ULTRASONICS: Primary | ICD-10-CM

## 2019-10-01 DIAGNOSIS — Z36.86 ENCOUNTER FOR SCREENING FOR RISK OF PRE-TERM LABOR: ICD-10-CM

## 2019-10-01 PROCEDURE — 76817 TRANSVAGINAL US OBSTETRIC: CPT | Performed by: OBSTETRICS & GYNECOLOGY

## 2019-10-01 PROCEDURE — 76805 OB US >/= 14 WKS SNGL FETUS: CPT | Performed by: OBSTETRICS & GYNECOLOGY

## 2019-10-08 ENCOUNTER — HOSPITAL ENCOUNTER (OUTPATIENT)
Age: 29
Setting detail: SPECIMEN
Discharge: HOME | End: 2019-10-08
Payer: COMMERCIAL

## 2019-10-08 ENCOUNTER — ROUTINE PRENATAL (OUTPATIENT)
Dept: OBGYN CLINIC | Age: 29
End: 2019-10-08

## 2019-10-08 VITALS — BODY MASS INDEX: 24.7 KG/M2 | DIASTOLIC BLOOD PRESSURE: 62 MMHG | SYSTOLIC BLOOD PRESSURE: 112 MMHG | WEIGHT: 177.13 LBS

## 2019-10-08 DIAGNOSIS — O23.42 URINARY TRACT INFECTION IN MOTHER DURING SECOND TRIMESTER OF PREGNANCY: ICD-10-CM

## 2019-10-08 DIAGNOSIS — Z34.02 ENCOUNTER FOR SUPERVISION OF NORMAL FIRST PREGNANCY IN SECOND TRIMESTER: Primary | ICD-10-CM

## 2019-10-08 DIAGNOSIS — Z3A.21 21 WEEKS GESTATION OF PREGNANCY: ICD-10-CM

## 2019-10-08 PROCEDURE — 0502F SUBSEQUENT PRENATAL CARE: CPT | Performed by: ADVANCED PRACTICE MIDWIFE

## 2019-10-10 LAB
CULTURE: NORMAL
Lab: NORMAL
SPECIMEN DESCRIPTION: NORMAL

## 2019-10-13 DIAGNOSIS — O23.42 RECURRENT URINARY TRACT INFECTION AFFECTING PREGNANCY IN SECOND TRIMESTER: ICD-10-CM

## 2019-10-14 RX ORDER — NITROFURANTOIN 25; 75 MG/1; MG/1
100 CAPSULE ORAL NIGHTLY
Qty: 30 CAPSULE | Refills: 2 | Status: SHIPPED | OUTPATIENT
Start: 2019-10-14 | End: 2019-11-25 | Stop reason: SDUPTHER

## 2019-11-04 ENCOUNTER — ROUTINE PRENATAL (OUTPATIENT)
Dept: OBGYN CLINIC | Age: 29
End: 2019-11-04

## 2019-11-04 VITALS — SYSTOLIC BLOOD PRESSURE: 122 MMHG | DIASTOLIC BLOOD PRESSURE: 72 MMHG | BODY MASS INDEX: 25.38 KG/M2 | WEIGHT: 182 LBS

## 2019-11-04 DIAGNOSIS — Z3A.25 25 WEEKS GESTATION OF PREGNANCY: Primary | ICD-10-CM

## 2019-11-04 PROCEDURE — 0502F SUBSEQUENT PRENATAL CARE: CPT | Performed by: OBSTETRICS & GYNECOLOGY

## 2019-11-25 DIAGNOSIS — O23.42 RECURRENT URINARY TRACT INFECTION AFFECTING PREGNANCY IN SECOND TRIMESTER: ICD-10-CM

## 2019-11-25 RX ORDER — NITROFURANTOIN 25; 75 MG/1; MG/1
100 CAPSULE ORAL NIGHTLY
Qty: 30 CAPSULE | Refills: 2 | Status: SHIPPED | OUTPATIENT
Start: 2019-11-25 | End: 2019-12-29 | Stop reason: SDUPTHER

## 2019-11-26 ENCOUNTER — HOSPITAL ENCOUNTER (OUTPATIENT)
Age: 29
Discharge: HOME | End: 2019-11-26
Payer: COMMERCIAL

## 2019-11-26 DIAGNOSIS — Z3A.25 25 WEEKS GESTATION OF PREGNANCY: ICD-10-CM

## 2019-11-26 LAB
BILIRUBIN URINE: NEGATIVE
COLOR: YELLOW
COMMENT UA: NORMAL
GLUCOSE ADMINISTRATION: NORMAL
GLUCOSE TOLERANCE SCREEN 50G: 114 MG/DL (ref 70–135)
GLUCOSE URINE: NEGATIVE
KETONES, URINE: NEGATIVE
LEUKOCYTE ESTERASE, URINE: NEGATIVE
NITRITE, URINE: NEGATIVE
PH UA: 7.5 (ref 5–8)
PROTEIN UA: NEGATIVE
SPECIFIC GRAVITY UA: 1.01 (ref 1–1.03)
TURBIDITY: CLEAR
URINE HGB: NEGATIVE
UROBILINOGEN, URINE: NORMAL

## 2019-11-26 PROCEDURE — 82950 GLUCOSE TEST: CPT

## 2019-11-26 PROCEDURE — 81003 URINALYSIS AUTO W/O SCOPE: CPT

## 2019-11-26 PROCEDURE — 36415 COLL VENOUS BLD VENIPUNCTURE: CPT

## 2019-11-26 PROCEDURE — 87086 URINE CULTURE/COLONY COUNT: CPT

## 2019-11-27 LAB
CULTURE: NO GROWTH
Lab: NORMAL
SPECIMEN DESCRIPTION: NORMAL

## 2019-12-04 ENCOUNTER — ROUTINE PRENATAL (OUTPATIENT)
Dept: OBGYN CLINIC | Age: 29
End: 2019-12-04
Payer: COMMERCIAL

## 2019-12-04 VITALS — SYSTOLIC BLOOD PRESSURE: 122 MMHG | BODY MASS INDEX: 25.52 KG/M2 | DIASTOLIC BLOOD PRESSURE: 72 MMHG | WEIGHT: 183 LBS

## 2019-12-04 DIAGNOSIS — Z34.92 NORMAL PREGNANCY IN SECOND TRIMESTER: Primary | ICD-10-CM

## 2019-12-04 DIAGNOSIS — Z3A.29 29 WEEKS GESTATION OF PREGNANCY: ICD-10-CM

## 2019-12-04 DIAGNOSIS — Z23 NEED FOR VACCINATION: ICD-10-CM

## 2019-12-04 PROCEDURE — 90471 IMMUNIZATION ADMIN: CPT | Performed by: NURSE PRACTITIONER

## 2019-12-04 PROCEDURE — 90715 TDAP VACCINE 7 YRS/> IM: CPT | Performed by: NURSE PRACTITIONER

## 2019-12-04 PROCEDURE — 0502F SUBSEQUENT PRENATAL CARE: CPT | Performed by: NURSE PRACTITIONER

## 2019-12-26 ENCOUNTER — ROUTINE PRENATAL (OUTPATIENT)
Dept: OBGYN CLINIC | Age: 29
End: 2019-12-26

## 2019-12-26 VITALS — BODY MASS INDEX: 26.5 KG/M2 | SYSTOLIC BLOOD PRESSURE: 118 MMHG | DIASTOLIC BLOOD PRESSURE: 62 MMHG | WEIGHT: 190 LBS

## 2019-12-26 DIAGNOSIS — Z34.92 NORMAL PREGNANCY IN SECOND TRIMESTER: Primary | ICD-10-CM

## 2019-12-26 PROCEDURE — 0502F SUBSEQUENT PRENATAL CARE: CPT | Performed by: OBSTETRICS & GYNECOLOGY

## 2019-12-26 RX ORDER — BREAST PUMP
EACH MISCELLANEOUS
Qty: 1 EACH | Refills: 0 | Status: SHIPPED | OUTPATIENT
Start: 2019-12-26

## 2019-12-29 DIAGNOSIS — O23.42 RECURRENT URINARY TRACT INFECTION AFFECTING PREGNANCY IN SECOND TRIMESTER: ICD-10-CM

## 2019-12-31 RX ORDER — NITROFURANTOIN 25; 75 MG/1; MG/1
100 CAPSULE ORAL NIGHTLY
Qty: 30 CAPSULE | Refills: 2 | Status: SHIPPED | OUTPATIENT
Start: 2019-12-31 | End: 2020-07-29

## 2020-01-06 ENCOUNTER — OFFICE VISIT (OUTPATIENT)
Dept: PRIMARY CARE CLINIC | Age: 30
End: 2020-01-06
Payer: COMMERCIAL

## 2020-01-06 VITALS
WEIGHT: 191 LBS | TEMPERATURE: 97.3 F | HEART RATE: 83 BPM | DIASTOLIC BLOOD PRESSURE: 73 MMHG | BODY MASS INDEX: 26.64 KG/M2 | SYSTOLIC BLOOD PRESSURE: 117 MMHG

## 2020-01-06 LAB
INFLUENZA A ANTIBODY: NORMAL
INFLUENZA B ANTIBODY: NORMAL
S PYO AG THROAT QL: NORMAL

## 2020-01-06 PROCEDURE — 99202 OFFICE O/P NEW SF 15 MIN: CPT | Performed by: INTERNAL MEDICINE

## 2020-01-06 PROCEDURE — 87880 STREP A ASSAY W/OPTIC: CPT | Performed by: INTERNAL MEDICINE

## 2020-01-06 PROCEDURE — 87804 INFLUENZA ASSAY W/OPTIC: CPT | Performed by: INTERNAL MEDICINE

## 2020-01-06 ASSESSMENT — PATIENT HEALTH QUESTIONNAIRE - PHQ9
SUM OF ALL RESPONSES TO PHQ QUESTIONS 1-9: 0
2. FEELING DOWN, DEPRESSED OR HOPELESS: 0
1. LITTLE INTEREST OR PLEASURE IN DOING THINGS: 0
SUM OF ALL RESPONSES TO PHQ9 QUESTIONS 1 & 2: 0
SUM OF ALL RESPONSES TO PHQ QUESTIONS 1-9: 0

## 2020-01-06 ASSESSMENT — ENCOUNTER SYMPTOMS: COUGH: 1

## 2020-01-10 ENCOUNTER — ROUTINE PRENATAL (OUTPATIENT)
Dept: OBGYN CLINIC | Age: 30
End: 2020-01-10

## 2020-01-10 VITALS — SYSTOLIC BLOOD PRESSURE: 126 MMHG | DIASTOLIC BLOOD PRESSURE: 72 MMHG | WEIGHT: 190 LBS | BODY MASS INDEX: 26.5 KG/M2

## 2020-01-10 PROCEDURE — 0502F SUBSEQUENT PRENATAL CARE: CPT | Performed by: OBSTETRICS & GYNECOLOGY

## 2020-01-10 NOTE — PROGRESS NOTES
Tian Sosa is a 34 y.o. female 34w6d        OB History    Para Term  AB Living   1 0 0 0 0 0   SAB TAB Ectopic Molar Multiple Live Births   0 0 0 0 0 0      # Outcome Date GA Lbr Galindo/2nd Weight Sex Delivery Anes PTL Lv   1 Current                Vitals  BP: 126/72  Weight: 190 lb (86.2 kg)  Patient Position: Sitting  Albumin: Negative  Glucose: Negative      The patient was seen and evaluated. There was positive fetal movements. No contractions or leakage of fluid. Signs and symptoms of  labor as well as labor were reviewed. The S/S of Pre-Eclampsia were reviewed with the patient in detail. She is to report any of these if they occur. She currently denies any of these. The patient had her 28 week labs completed. 19 Declined 1st Trimester Screen-TS  9/3/19 Declined Quad Screen-TS  [facility] for delivery  FOB - Jeanmarie Hernandez  Gender is a SURPRISE  TDAP given 19-sc      T-Dap Vaccine (27-36 weeks): completed    The patient was instructed on fetal kick counts and was given a kick sheet to complete every 8 hours. She was instructed that the baby should move at a minimum of ten times within one hour after a meal. The patient was instructed to lay down on her left side twenty minutes after eating and count movements for up to one hour with a target value of ten movements. She was instructed to notify the office if she did not make that target after two attempts or if after any attempt there was less than four movements. The patient reports that the targets have been made Yes.  Testing:  none    Assessment:  1. Tian Sosa is a 34 y.o. female  2.    3. 34w6d    Patient Active Problem List    Diagnosis Date Noted    Normal pregnancy in second trimester 2019     Declines genetics  Anatomy [facility] as clinically indicated      Urinary tract infection in mother during second trimester of pregnancy 09/03/2019     x2 rpt UC ordered  tx

## 2020-01-23 ENCOUNTER — ROUTINE PRENATAL (OUTPATIENT)
Dept: OBGYN CLINIC | Age: 30
End: 2020-01-23

## 2020-01-23 VITALS — BODY MASS INDEX: 26.92 KG/M2 | SYSTOLIC BLOOD PRESSURE: 120 MMHG | DIASTOLIC BLOOD PRESSURE: 72 MMHG | WEIGHT: 193 LBS

## 2020-01-23 PROCEDURE — 0502F SUBSEQUENT PRENATAL CARE: CPT | Performed by: OBSTETRICS & GYNECOLOGY

## 2020-01-23 NOTE — PROGRESS NOTES
]  Sensitivities for clindamycin and erythromycin were ordered. No      The patient was counseled on the mandatory call ahead policy. She has been instructed to call the office at anytime prior to going into the hospital so the on-call physician may direct her to the appropriate facility for care. Exceptions were reviewed including but not limited to: Decreased fetal movement, vaginal Bleeding or hemorrhage, trauma, readily expectant delivery, or any instance where she feels 911 should be utilized. The patient was counseled on Labor & Delivery. Route of delivery and counseling on vaginal, operative vaginal, and  sections were completed with the risks of each to both the patient as well as her baby. The possibility of a blood transfusion was discussed as well. The patient was not opposed to receiving a transfusion if needed. The patient was counseled on types of analgesia during labor and is considering either Regional or IV medication the risks, benefits and alternatives were discussed.  Testing:  none      Assessment:  1. Tejas Gee is a 34 y.o. female  2.   3. 36w5d      Patient Active Problem List    Diagnosis Date Noted    Normal pregnancy in second trimester 2019     Overview Note:     Declines genetics  Anatomy scan WNL follow up at [facility] as clinically indicated      Urinary tract infection in mother during second trimester of pregnancy 2019     Overview Note:     x2 rpt UC ordered  tx sent 19 discussed with Victor Manuel scanlon with macrobid and suppression therapy started   WIll need rpt UC at [facility] [place] in October   10/8 UC neg          Diagnosis Orders   1. Normal pregnancy in third trimester     2. 36 weeks gestation of pregnancy       Plan:  The patient will return to the office for her next visit in 1 weeks. See antepartum flow sheet. Counseled on s/s of preeclampsia. Counseled on s/s of pretem labor.   [address] discussed  GBS at next visit

## 2020-02-03 ENCOUNTER — HOSPITAL ENCOUNTER (OUTPATIENT)
Age: 30
Setting detail: SPECIMEN
Discharge: HOME | End: 2020-02-03
Payer: COMMERCIAL

## 2020-02-03 ENCOUNTER — ROUTINE PRENATAL (OUTPATIENT)
Dept: OBGYN CLINIC | Age: 30
End: 2020-02-03

## 2020-02-03 VITALS — SYSTOLIC BLOOD PRESSURE: 122 MMHG | BODY MASS INDEX: 26.92 KG/M2 | DIASTOLIC BLOOD PRESSURE: 64 MMHG | WEIGHT: 193 LBS

## 2020-02-03 PROCEDURE — 0502F SUBSEQUENT PRENATAL CARE: CPT | Performed by: OBSTETRICS & GYNECOLOGY

## 2020-02-03 NOTE — PROGRESS NOTES
Exam was:   Pt declined    The patient was counseled on the mandatory call ahead policy. She has been instructed to call the office at anytime prior to going into the hospital so the on-call physician may direct her to the appropriate facility for care. Exceptions were reviewed including but not limited to: Decreased fetal movement, vaginal Bleeding or hemorrhage, trauma, readily expectant delivery, or any instance where she feels 911 should be utilized. The patient was counseled on Labor & Delivery. Route of delivery and counseling on vaginal, operative vaginal, and  sections were completed with the risks of each to both the patient as well as her baby. The possibility of a blood transfusion was discussed as well. The patient was not opposed to receiving a transfusion if needed. The patient was counseled on types of analgesia during labor and is considering either Regional or IV medication the risks, benefits and alternatives were discussed.  Testing:  none    Assessment:  1. Mary Roger is a 34 y.o. female  2.   3. 38w2d    Patient Active Problem List    Diagnosis Date Noted    Normal pregnancy in second trimester 2019     Overview Note:     Declines genetics  Anatomy scan [facility]NL [facility] at [facility] as clinically indicated      Urinary tract infection in mother during second trimester of pregnancy 2019     Overview Note:     x2 rpt UC ordered  tx sent 19 discussed with Victor Manuel scanlon with macrobid and suppression therapy started   WIll need rpt UC at [facility] in October   10/8 UC neg          Diagnosis Orders   1. Normal pregnancy in third trimester  Strep B Screen, Vaginal / Rectal   2. 38 weeks gestation of pregnancy  Strep B Screen, Vaginal / Rectal     Plan:  The patient will return to the office for her next visit in 1 weeks. See antepartum flow sheet. Pt counseled on s/s of preeclampsia. Counseled on s/s of  labor. [address] discussed in detail.

## 2020-02-06 LAB
CULTURE: NORMAL
Lab: NORMAL
SPECIMEN DESCRIPTION: NORMAL

## 2020-02-13 ENCOUNTER — ROUTINE PRENATAL (OUTPATIENT)
Dept: OBGYN CLINIC | Age: 30
End: 2020-02-13

## 2020-02-13 VITALS — WEIGHT: 197 LBS | BODY MASS INDEX: 27.48 KG/M2 | SYSTOLIC BLOOD PRESSURE: 120 MMHG | DIASTOLIC BLOOD PRESSURE: 70 MMHG

## 2020-02-13 PROCEDURE — 0502F SUBSEQUENT PRENATAL CARE: CPT | Performed by: NURSE PRACTITIONER

## 2020-02-13 NOTE — PATIENT INSTRUCTIONS
make and go to all appointments, and call your doctor if you are having problems. It's also a good idea to know your test results and keep a list of the medicines you take. How can you care for yourself at home? · Take and record your blood pressure at home if your doctor tells you to. ? Learn the importance of the two measures of blood pressure (such as 120 over 80, or 120/80). The first number is the systolic pressure, which is the force of blood on the artery walls as the heart pumps. The second number is the diastolic pressure, which is the force of blood on the artery walls between heartbeats, when the heart is at rest. You have a choice of monitors to use. ? Manual monitor: You pump up the cuff and use a stethoscope to listen for your pulse. ? Electronic monitor: The cuff inflates, and a gauge shows your pulse rate. ? To take your blood pressure:  ? Ask your doctor to check your blood pressure monitor to be sure that it is accurate and that the cuff fits you. Also ask your doctor to watch you to make sure that you are using it right. ? You should not eat, use tobacco products, or use medicine known to raise blood pressure (such as some nasal decongestant sprays) before you take your blood pressure. ? Avoid taking your blood pressure if you have just exercised. Also avoid taking it if you are nervous or upset. Rest at least 15 minutes before you take your blood pressure. · If your doctor advises, check the protein levels in your urine. Your doctor or nurse will show you how to do this. · Take your medicines exactly as prescribed. Call your doctor if you think you are having a problem with your medicine. · Do not smoke. Quitting smoking will help improve your baby's growth and health. If you need help quitting, talk to your doctor about stop-smoking programs and medicines. These can increase your chances of quitting for good.   · Eat a balanced and healthy diet that has lots of fruits and

## 2020-02-13 NOTE — PROGRESS NOTES
Presents for OB visit  Gestation 39w5d  Estimated Date of Delivery: 2/15/20    7/11/19 Declined 1st Trimester Screen-TS  9/3/19 Declined Quad Screen-TS  [facility] for delivery  FOB - Beverlie Doom  Gender is a SURPRISE  TDAP given 19-sc  GBS obtained 2/3/20- negative                  OB History    Para Term  AB Living   1 0 0 0 0 0   SAB TAB Ectopic Molar Multiple Live Births   0 0 0 0 0 0      # Outcome Date GA Lbr Galindo/2nd Weight Sex Delivery Anes PTL Lv   1 Current                     No Known Allergies  Current Outpatient Medications   Medication Sig Dispense Refill    nitrofurantoin, macrocrystal-monohydrate, (MACROBID) 100 MG capsule Take 1 capsule by mouth nightly Take 1 capsule nightly for the remainder of pregnancy. Complete other macrobid script sent in first 30 capsule 2    Misc. Devices (BREAST PUMP) MISC Double electric 1 each 0    Prenatal Vit-Fe Fumarate-FA (PRENATAL PO) Take by mouth      cephALEXin (KEFLEX) 500 MG capsule Take 1 capsule by mouth 4 times daily (Patient not taking: Reported on 2020) 28 capsule 0     No current facility-administered medications for this visit.       Social History     Socioeconomic History    Marital status:      Spouse name: Not on file    Number of children: Not on file    Years of education: Not on file    Highest education level: Not on file   Occupational History    Not on file   Social Needs    Financial resource strain: Not on file    Food insecurity:     Worry: Not on file     Inability: Not on file    Transportation needs:     Medical: Not on file     Non-medical: Not on file   Tobacco Use    Smoking status: Never Smoker    Smokeless tobacco: Never Used   Substance and Sexual Activity    Alcohol use: No    Drug use: No    Sexual activity: Yes     Comment: 1 partner   Lifestyle    Physical activity:     Days per week: Not on file     Minutes per session: Not on file    Stress: Not on file   Relationships    Social connections:     Talks on phone: Not on file     Gets together: Not on file     Attends Confucianist service: Not on file     Active member of club or organization: Not on file     Attends meetings of clubs or organizations: Not on file     Relationship status: Not on file    Intimate partner violence:     Fear of current or ex partner: Not on file     Emotionally abused: Not on file     Physically abused: Not on file     Forced sexual activity: Not on file   Other Topics Concern    Not on file   Social History Narrative    Not on file         No past medical history on file. Past Surgical History:   Procedure Laterality Date    WISDOM TOOTH EXTRACTION       Headaches: no  Swelling: no  Bleeding: no  Discharge: yes - noted increase in mucus today no odor or itching   Dysuria: no  Nausea: no  Heartburn: no  Epigastric pain: no  Contractions: not regular  Leakage of fluid: no  + fetal movement       Return Monday for NSt she will be over 40 weeks     Labs as indicated n/a      PTL signs reviewed, if indicated  Kick Count Instructions given if indicated: The patient was instructed on fetal kick counts and was given a kick sheet to complete every 8 hours. This is to begin at 28 weeks gestation. She was instructed that the baby should move at a minimum of ten times within one hour after a meal. The patient was instructed to lay down on her left side twenty minutes after eating and count movements for up to one hour with a target value of ten movements. She was instructed to notify the office if she did not make that target after two attempts or if after any attempt there was less than four movements. After hour numbers reviewed , along with labor signs if indicated. Contractions/cramping between 5-7 minutes and persisting even with attempts of increased water and laying on side. Fluid leakage, bleeding  NST information given yes - Monday     The patient was counseled on the mandatory call ahead policy.  She has been instructed to call the office at anytime prior to going into the hospital so the on-call physician may direct her to the appropriate facility for care. Exceptions were reviewed including but not limited to: Decreased fetal movement, vaginal Bleeding or hemorrhage, trauma, readily expectant delivery, or any instance where she feels 911 should be utilized. Of the 15 minute duration appointment visit, Marsha Kaur CNP spent at least 50% of the face-to-face time in counseling, explanation of diagnosis, planning of further management, and answering all questions.

## 2020-02-14 ENCOUNTER — HOSPITAL ENCOUNTER (INPATIENT)
Age: 30
LOS: 2 days | Discharge: HOME | End: 2020-02-16
Attending: OBSTETRICS & GYNECOLOGY | Admitting: OBSTETRICS & GYNECOLOGY
Payer: COMMERCIAL

## 2020-02-14 ENCOUNTER — ANESTHESIA (OUTPATIENT)
Dept: LABOR AND DELIVERY | Age: 30
End: 2020-02-14
Payer: COMMERCIAL

## 2020-02-14 ENCOUNTER — ANESTHESIA EVENT (OUTPATIENT)
Dept: LABOR AND DELIVERY | Age: 30
End: 2020-02-14
Payer: COMMERCIAL

## 2020-02-14 PROBLEM — Z33.1 IUP (INTRAUTERINE PREGNANCY), INCIDENTAL: Status: RESOLVED | Noted: 2020-02-14 | Resolved: 2020-02-14

## 2020-02-14 PROBLEM — Z33.1 IUP (INTRAUTERINE PREGNANCY), INCIDENTAL: Status: ACTIVE | Noted: 2020-02-14

## 2020-02-14 LAB
ABO/RH: NORMAL
ABSOLUTE EOS #: 0.04 K/UL (ref 0–0.44)
ABSOLUTE IMMATURE GRANULOCYTE: 0.17 K/UL (ref 0–0.3)
ABSOLUTE LYMPH #: 1.48 K/UL (ref 1.1–3.7)
ABSOLUTE MONO #: 1.4 K/UL (ref 0.1–1.2)
AMPHETAMINE SCREEN URINE: NEGATIVE
ANTIBODY SCREEN: NEGATIVE
ARM BAND NUMBER: NORMAL
BARBITURATE SCREEN URINE: NEGATIVE
BASOPHILS # BLD: 1 % (ref 0–2)
BASOPHILS ABSOLUTE: 0.09 K/UL (ref 0–0.2)
BENZODIAZEPINE SCREEN, URINE: NEGATIVE
BUPRENORPHINE URINE: NORMAL
CANNABINOID SCREEN URINE: NEGATIVE
COCAINE METABOLITE, URINE: NEGATIVE
DIFFERENTIAL TYPE: ABNORMAL
EOSINOPHILS RELATIVE PERCENT: 0 % (ref 1–4)
EXPIRATION DATE: NORMAL
HCT VFR BLD CALC: 38 % (ref 36.3–47.1)
HEMOGLOBIN: 11.8 G/DL (ref 11.9–15.1)
IMMATURE GRANULOCYTES: 1 %
LYMPHOCYTES # BLD: 8 % (ref 24–43)
MCH RBC QN AUTO: 28.7 PG (ref 25.2–33.5)
MCHC RBC AUTO-ENTMCNC: 31.1 G/DL (ref 28.4–34.8)
MCV RBC AUTO: 92.5 FL (ref 82.6–102.9)
MDMA URINE: NORMAL
METHADONE SCREEN, URINE: NEGATIVE
METHAMPHETAMINE, URINE: NORMAL
MONOCYTES # BLD: 8 % (ref 3–12)
NRBC AUTOMATED: 0 PER 100 WBC
OPIATES, URINE: NEGATIVE
OXYCODONE SCREEN URINE: NEGATIVE
PDW BLD-RTO: 13.2 % (ref 11.8–14.4)
PHENCYCLIDINE, URINE: NEGATIVE
PLATELET # BLD: 233 K/UL (ref 138–453)
PLATELET ESTIMATE: ABNORMAL
PMV BLD AUTO: 12.2 FL (ref 8.1–13.5)
PROPOXYPHENE, URINE: NORMAL
RBC # BLD: 4.11 M/UL (ref 3.95–5.11)
RBC # BLD: ABNORMAL 10*6/UL
SEG NEUTROPHILS: 82 % (ref 36–65)
SEGMENTED NEUTROPHILS ABSOLUTE COUNT: 14.39 K/UL (ref 1.5–8.1)
T. PALLIDUM, IGG: NONREACTIVE
TEST INFORMATION: NORMAL
TRICYCLIC ANTIDEPRESSANTS, UR: NORMAL
WBC # BLD: 17.6 K/UL (ref 3.5–11.3)
WBC # BLD: ABNORMAL 10*3/UL

## 2020-02-14 PROCEDURE — 88307 TISSUE EXAM BY PATHOLOGIST: CPT

## 2020-02-14 PROCEDURE — 86780 TREPONEMA PALLIDUM: CPT

## 2020-02-14 PROCEDURE — 6370000000 HC RX 637 (ALT 250 FOR IP): Performed by: OBSTETRICS & GYNECOLOGY

## 2020-02-14 PROCEDURE — 85025 COMPLETE CBC W/AUTO DIFF WBC: CPT

## 2020-02-14 PROCEDURE — 86901 BLOOD TYPING SEROLOGIC RH(D): CPT

## 2020-02-14 PROCEDURE — 6360000002 HC RX W HCPCS: Performed by: OBSTETRICS & GYNECOLOGY

## 2020-02-14 PROCEDURE — 80307 DRUG TEST PRSMV CHEM ANLYZR: CPT

## 2020-02-14 PROCEDURE — 6360000002 HC RX W HCPCS: Performed by: ANESTHESIOLOGY

## 2020-02-14 PROCEDURE — 86900 BLOOD TYPING SEROLOGIC ABO: CPT

## 2020-02-14 PROCEDURE — 7200000001 HC VAGINAL DELIVERY

## 2020-02-14 PROCEDURE — 6370000000 HC RX 637 (ALT 250 FOR IP): Performed by: STUDENT IN AN ORGANIZED HEALTH CARE EDUCATION/TRAINING PROGRAM

## 2020-02-14 PROCEDURE — 6360000002 HC RX W HCPCS: Performed by: NURSE ANESTHETIST, CERTIFIED REGISTERED

## 2020-02-14 PROCEDURE — 0KQM0ZZ REPAIR PERINEUM MUSCLE, OPEN APPROACH: ICD-10-PCS | Performed by: OBSTETRICS & GYNECOLOGY

## 2020-02-14 PROCEDURE — 86850 RBC ANTIBODY SCREEN: CPT

## 2020-02-14 PROCEDURE — 1220000000 HC SEMI PRIVATE OB R&B

## 2020-02-14 PROCEDURE — 2580000003 HC RX 258: Performed by: STUDENT IN AN ORGANIZED HEALTH CARE EDUCATION/TRAINING PROGRAM

## 2020-02-14 PROCEDURE — 2500000003 HC RX 250 WO HCPCS: Performed by: NURSE ANESTHETIST, CERTIFIED REGISTERED

## 2020-02-14 PROCEDURE — 3700000025 EPIDURAL BLOCK: Performed by: ANESTHESIOLOGY

## 2020-02-14 RX ORDER — NALBUPHINE HCL 10 MG/ML
5 AMPUL (ML) INJECTION EVERY 4 HOURS PRN
Status: DISCONTINUED | OUTPATIENT
Start: 2020-02-14 | End: 2020-02-14

## 2020-02-14 RX ORDER — DOCUSATE SODIUM 100 MG/1
100 CAPSULE, LIQUID FILLED ORAL 2 TIMES DAILY
Status: DISCONTINUED | OUTPATIENT
Start: 2020-02-14 | End: 2020-02-15

## 2020-02-14 RX ORDER — SIMETHICONE 80 MG
80 TABLET,CHEWABLE ORAL EVERY 6 HOURS PRN
Status: DISCONTINUED | OUTPATIENT
Start: 2020-02-14 | End: 2020-02-16 | Stop reason: HOSPADM

## 2020-02-14 RX ORDER — ONDANSETRON 2 MG/ML
4 INJECTION INTRAMUSCULAR; INTRAVENOUS EVERY 6 HOURS PRN
Status: DISCONTINUED | OUTPATIENT
Start: 2020-02-14 | End: 2020-02-14 | Stop reason: SDUPTHER

## 2020-02-14 RX ORDER — LIDOCAINE HYDROCHLORIDE AND EPINEPHRINE 15; 5 MG/ML; UG/ML
INJECTION, SOLUTION EPIDURAL PRN
Status: DISCONTINUED | OUTPATIENT
Start: 2020-02-14 | End: 2020-02-14 | Stop reason: SDUPTHER

## 2020-02-14 RX ORDER — NALOXONE HYDROCHLORIDE 0.4 MG/ML
0.4 INJECTION, SOLUTION INTRAMUSCULAR; INTRAVENOUS; SUBCUTANEOUS PRN
Status: DISCONTINUED | OUTPATIENT
Start: 2020-02-14 | End: 2020-02-14

## 2020-02-14 RX ORDER — DIPHENHYDRAMINE HCL 25 MG
50 TABLET ORAL EVERY 6 HOURS PRN
Status: DISCONTINUED | OUTPATIENT
Start: 2020-02-14 | End: 2020-02-16 | Stop reason: HOSPADM

## 2020-02-14 RX ORDER — ACETAMINOPHEN 500 MG
1000 TABLET ORAL EVERY 6 HOURS PRN
Status: DISCONTINUED | OUTPATIENT
Start: 2020-02-14 | End: 2020-02-16 | Stop reason: HOSPADM

## 2020-02-14 RX ORDER — ROPIVACAINE HYDROCHLORIDE 2 MG/ML
INJECTION, SOLUTION EPIDURAL; INFILTRATION; PERINEURAL
Status: COMPLETED
Start: 2020-02-14 | End: 2020-02-14

## 2020-02-14 RX ORDER — ROPIVACAINE HYDROCHLORIDE 2 MG/ML
INJECTION, SOLUTION EPIDURAL; INFILTRATION; PERINEURAL PRN
Status: DISCONTINUED | OUTPATIENT
Start: 2020-02-14 | End: 2020-02-14 | Stop reason: SDUPTHER

## 2020-02-14 RX ORDER — SODIUM CHLORIDE, SODIUM LACTATE, POTASSIUM CHLORIDE, CALCIUM CHLORIDE 600; 310; 30; 20 MG/100ML; MG/100ML; MG/100ML; MG/100ML
INJECTION, SOLUTION INTRAVENOUS CONTINUOUS
Status: DISCONTINUED | OUTPATIENT
Start: 2020-02-14 | End: 2020-02-14

## 2020-02-14 RX ORDER — ACETAMINOPHEN 500 MG
1000 TABLET ORAL EVERY 6 HOURS PRN
Status: DISCONTINUED | OUTPATIENT
Start: 2020-02-14 | End: 2020-02-14

## 2020-02-14 RX ORDER — LANOLIN 100 %
OINTMENT (GRAM) TOPICAL PRN
Status: DISCONTINUED | OUTPATIENT
Start: 2020-02-14 | End: 2020-02-16 | Stop reason: HOSPADM

## 2020-02-14 RX ORDER — CALCIUM CARBONATE 200(500)MG
1000 TABLET,CHEWABLE ORAL 3 TIMES DAILY PRN
Status: DISCONTINUED | OUTPATIENT
Start: 2020-02-14 | End: 2020-02-16 | Stop reason: HOSPADM

## 2020-02-14 RX ORDER — KETOROLAC TROMETHAMINE 30 MG/ML
INJECTION, SOLUTION INTRAMUSCULAR; INTRAVENOUS
Status: DISPENSED
Start: 2020-02-14 | End: 2020-02-14

## 2020-02-14 RX ORDER — SODIUM CHLORIDE 0.9 % (FLUSH) 0.9 %
10 SYRINGE (ML) INJECTION PRN
Status: DISCONTINUED | OUTPATIENT
Start: 2020-02-14 | End: 2020-02-14

## 2020-02-14 RX ORDER — KETOROLAC TROMETHAMINE 30 MG/ML
30 INJECTION, SOLUTION INTRAMUSCULAR; INTRAVENOUS ONCE
Status: COMPLETED | OUTPATIENT
Start: 2020-02-14 | End: 2020-02-14

## 2020-02-14 RX ORDER — ONDANSETRON 2 MG/ML
4 INJECTION INTRAMUSCULAR; INTRAVENOUS EVERY 6 HOURS PRN
Status: DISCONTINUED | OUTPATIENT
Start: 2020-02-14 | End: 2020-02-14

## 2020-02-14 RX ORDER — IBUPROFEN 800 MG/1
800 TABLET ORAL EVERY 8 HOURS PRN
Status: DISCONTINUED | OUTPATIENT
Start: 2020-02-14 | End: 2020-02-16 | Stop reason: HOSPADM

## 2020-02-14 RX ORDER — BISACODYL 10 MG
10 SUPPOSITORY, RECTAL RECTAL DAILY PRN
Status: DISCONTINUED | OUTPATIENT
Start: 2020-02-14 | End: 2020-02-16 | Stop reason: HOSPADM

## 2020-02-14 RX ORDER — ONDANSETRON 4 MG/1
4 TABLET, FILM COATED ORAL EVERY 6 HOURS PRN
Status: DISCONTINUED | OUTPATIENT
Start: 2020-02-14 | End: 2020-02-16 | Stop reason: HOSPADM

## 2020-02-14 RX ADMIN — ACETAMINOPHEN 1000 MG: 500 TABLET ORAL at 18:07

## 2020-02-14 RX ADMIN — BENZOCAINE AND LEVOMENTHOL: 200; 5 SPRAY TOPICAL at 18:11

## 2020-02-14 RX ADMIN — IBUPROFEN 800 MG: 800 TABLET, FILM COATED ORAL at 23:14

## 2020-02-14 RX ADMIN — SODIUM CHLORIDE, POTASSIUM CHLORIDE, SODIUM LACTATE AND CALCIUM CHLORIDE: 600; 310; 30; 20 INJECTION, SOLUTION INTRAVENOUS at 02:50

## 2020-02-14 RX ADMIN — IBUPROFEN 800 MG: 800 TABLET, FILM COATED ORAL at 12:16

## 2020-02-14 RX ADMIN — ROPIVACAINE HYDROCHLORIDE 8 ML: 2 INJECTION, SOLUTION EPIDURAL; INFILTRATION at 03:55

## 2020-02-14 RX ADMIN — KETOROLAC TROMETHAMINE 30 MG: 30 INJECTION, SOLUTION INTRAMUSCULAR; INTRAVENOUS at 08:19

## 2020-02-14 RX ADMIN — DOCUSATE SODIUM 100 MG: 100 CAPSULE, LIQUID FILLED ORAL at 18:07

## 2020-02-14 RX ADMIN — Medication 12 ML/HR: at 04:00

## 2020-02-14 RX ADMIN — LIDOCAINE HYDROCHLORIDE,EPINEPHRINE BITARTRATE 3 ML: 15; .005 INJECTION, SOLUTION EPIDURAL; INFILTRATION; INTRACAUDAL; PERINEURAL at 03:47

## 2020-02-14 ASSESSMENT — PAIN SCALES - GENERAL
PAINLEVEL_OUTOF10: 4
PAINLEVEL_OUTOF10: 3
PAINLEVEL_OUTOF10: 4
PAINLEVEL_OUTOF10: 2

## 2020-02-14 NOTE — ANESTHESIA PRE PROCEDURE
Leobardo Thompson MD           Allergies:  No Known Allergies    Problem List:    Patient Active Problem List   Diagnosis Code    Normal pregnancy in second trimester Z34.92    Urinary tract infection in mother during second trimester of pregnancy O23.42    IUP (intrauterine pregnancy), incidental Z34.90       Past Medical History:  History reviewed. No pertinent past medical history. Past Surgical History:        Procedure Laterality Date    WISDOM TOOTH EXTRACTION         Social History:    Social History     Tobacco Use    Smoking status: Never Smoker    Smokeless tobacco: Never Used   Substance Use Topics    Alcohol use: No                                Counseling given: Not Answered      Vital Signs (Current):   Vitals:    02/14/20 0109 02/14/20 0215 02/14/20 0300   BP: 112/85  (!) 148/88   Pulse: 103  106   Resp: 18  18   Temp: 36.3 °C (97.3 °F)     TempSrc: Oral     Weight:  197 lb (89.4 kg)    Height:  5' 11\" (1.803 m)                                               BP Readings from Last 3 Encounters:   02/14/20 (!) 148/88   02/13/20 120/70   02/03/20 122/64       NPO Status:                                                                                 BMI:   Wt Readings from Last 3 Encounters:   02/14/20 197 lb (89.4 kg)   02/13/20 197 lb (89.4 kg)   02/03/20 193 lb (87.5 kg)     Body mass index is 27.48 kg/m². CBC:   Lab Results   Component Value Date    WBC 17.6 02/14/2020    RBC 4.11 02/14/2020    HGB 11.8 02/14/2020    HCT 38.0 02/14/2020    MCV 92.5 02/14/2020    RDW 13.2 02/14/2020     02/14/2020       CMP:   Lab Results   Component Value Date    GLUCOSE 114 11/26/2019    GLUCOSE 101 07/15/2019       POC Tests: No results for input(s): POCGLU, POCNA, POCK, POCCL, POCBUN, POCHEMO, POCHCT in the last 72 hours.     Coags: No results found for: PROTIME, INR, APTT    HCG (If Applicable):   Lab Results   Component Value Date    HCGQUANT 159,509 (H) 07/15/2019        ABGs: No results found for: PHART, PO2ART, XVZ6TJR, RWU0QDQ, BEART, Y1IPGRFI     Type & Screen (If Applicable):  No results found for: LABABO, LABRH    Anesthesia Evaluation   no history of anesthetic complications:   Airway: Mallampati: II  TM distance: >3 FB   Neck ROM: full  Mouth opening: > = 3 FB Dental:          Pulmonary:Negative Pulmonary ROS and normal exam                               Cardiovascular:Negative CV ROS                      Neuro/Psych:   Negative Neuro/Psych ROS              GI/Hepatic/Renal: Neg GI/Hepatic/Renal ROS            Endo/Other: Negative Endo/Other ROS                    Abdominal:           Vascular: negative vascular ROS. Anesthesia Plan      epidural     ASA 2     (Platelets 718)        Anesthetic plan and risks discussed with patient. Use of blood products discussed with patient whom. Plan discussed with attending.                   SONIA Rodriguez - CRNA   2/14/2020

## 2020-02-14 NOTE — ANESTHESIA PROCEDURE NOTES
Epidural Block    Patient location during procedure: OB  Start time: 2/14/2020 3:53 AM  End time: 2/14/2020 3:53 AM  Reason for block: labor epidural  Staffing  Resident/CRNA: SONIA Bustamante CRNA  Preanesthetic Checklist  Completed: patient identified, site marked, surgical consent, pre-op evaluation, timeout performed, IV checked, risks and benefits discussed, monitors and equipment checked, anesthesia consent given, oxygen available and patient being monitored  Epidural  Patient position: sitting  Prep: Betadine  Patient monitoring: continuous pulse ox and frequent blood pressure checks  Approach: midline  Location: lumbar (1-5)  Injection technique: KT saline  Provider prep: mask and sterile gloves  Needle  Needle type: Tuohy   Needle gauge: 17 G  Needle length: 3.5 in  Needle insertion depth: 5 cm  Catheter type: side hole  Catheter size: 19 G  Catheter at skin depth: 11 cm  Test dose: negative  Assessment  Hemodynamics: stable  Attempts: 1

## 2020-02-14 NOTE — L&D DELIVERY NOTE
Vessels  Complications:  Nuchal  Nuchal intervention:  reduced  Nuchal cord description:  loose nuchal cord  Number of loops:  1  Delayed cord clamping?:  Yes  Cord clamped date/time:  2020 0733  Cord blood disposition:  Lab  Gases sent?:  Yes  Stem cell collection (by provider): No     Placenta    Date/time:  2020 07:38:00  Removal:  Spontaneous  Appearance:  Intact  Disposition:  Discarded     Delivery Resuscitation    Method:  Stimulation     Apgars    Living status:  Living  Apgars   1 Minute:   5 Minute:   10 Minute 15 Minute 20 Minute   Skin Color: 0  1       Heart Rate: 2  2       Reflex Irritability: 2  2       Muscle Tone: 2  2       Respiratory Effort: 2  2       Total: 8  9               Apgars Assigned By:  Katiana Li     Skin to Skin    Skin to skin initiation date/time: 20 07:32:00   Skin to skin with:   Mother  Skin to skin end date/time:         Measurements        ID band #:  [id number]       Delivery Information    Episiotomy:  None  Sulcus laceration:  right    Vaginal laceration:  No    Cervical laceration:  No    Vaginal delivery est. blood loss (mL):  350  Surgical or additional est. blood loss (mL):  0 (View Only):  Edit in Flowsheets   Combined est. blood loss (mL):  350     Vaginal Delivery Counts    Initial count personnel:  KEHINDE  Initial count verified by:  Katiana Li   4x4:   Needles:   Instruments:   Lap Pads:   Sponges:     Initial counts:          Final counts:          Final count personnel:  Omkar Bingham  Final count verified by:  Katiana Li  Final vaginal sweep completed:  Yes     Other Procedures    Procedures:  None          Vaginal Delivery Note  Department of Obstetrics and Gynecology  [facility]       Patient: Torsten Medrano   : 1990  MRN: [id number]   Date of delivery: 20     Pre-operative Diagnosis: Wendy Chaves at 39w6d   Spontaneous Labor   Hx UTI in pregnancy   BMI 27.5    Post-operative Diagnosis:  Living  [id number]/5132-45  Admission Date/Time: 2/14/2020  1:04 AM        Attending Physician Statement  I have discussed the care of [facility], including pertinent history and exam findings with the resident. I have reviewed and edited their note in the electronic medical record. The key elements of all parts of the encounter have been performed/reviewed by me . I agree with the assessment, plan and orders as documented by the resident. The level of care submitted represents to the best of my ability the care documented in the medical record today. GC Modifier. This service has been performed in part by a resident under the direction of a teaching physician.         Attending's Name:  Yash Diehl DO

## 2020-02-14 NOTE — FLOWSHEET NOTE
Pt to L&D complaining of ctx every 5-10 minutes. Pt states she has no leaking or bleeding. Pt states she has been feeling baby move.

## 2020-02-14 NOTE — DISCHARGE SUMMARY
Obstetric Discharge Summary  [address] [facility]    Patient Name: Clayton Pollock  Patient : 1990  Primary Care Physician: Celestino Emerson DO  Admit Date: 2020    Principal Diagnosis: IUP at 39w6d, admitted for spontaneous labor     Her pregnancy has been complicated by:   Patient Active Problem List   Diagnosis    Normal pregnancy in second trimester    Urinary tract infection in mother during second trimester of pregnancy     20 F Apg 8/9 Wt 8#0    Gestational hypertension       Infection Present?: No  Hospital Acquired: No    Surgical Operations & Procedures:  [] Pitocin Induction of Labor  [] Pitocin Augmentation of Labor  [] Prostaglandin Induction of Labor  [] Mechanical Induction of Labor  [] Artificial Rupture of Membranes  [] Intrauterine Pressure Catheter  [] Fetal Scalp Electrode  [] Amnioinfusion  Analgesia: epidural  Delivery Type: Spontaneous Vaginal Delivery: See Labor and Delivery Summary   Laceration(s): Second degree laceration R sulcal  Suture used for repair:  Vicryl 3.0. Consultations: Anesthesia     Pertinent Findings & Procedures:   Clayton Pollock is a 34 y.o. female  at 39w6d admitted to [address] [place] [address] for spontaneous labor; received epidural. She delivered by spontaneous vaginal a Live Born infant on 20. Information for the patient's :  Rian Fox [[id number]]   female  Birth Weight: 8 lb 0.2 oz (3.635 kg)    Apgars: 8 at 1 minute and 9 at 5 minutes. Postpartum course:     PPD#1: Patient met criteria for gestational HTN. PreE labs WNL.  P/C 0.16    Course of patient: uncomplicated    Discharge to: Home    Readmission planned: no     Recommendations on Discharge:     Medications:      Medication List      START taking these medications    ibuprofen 800 MG tablet  Commonly known as:  ADVIL;MOTRIN  Take 1 tablet by mouth every 8 hours as needed for Pain or Fever     senna-docusate 8.6-50 MG per tablet  Commonly known as: PERICOLACE  Take 2 tablets by mouth 2 times daily        CONTINUE taking these medications    Breast Pump Misc  Double electric     cephALEXin 500 MG capsule  Commonly known as:  KEFLEX  Take 1 capsule by mouth 4 times daily     nitrofurantoin (macrocrystal-monohydrate) 100 MG capsule  Commonly known as:  Macrobid  Take 1 capsule by mouth nightly Take 1 capsule nightly for the remainder of pregnancy. Complete other macrobid script sent in first     PRENATAL PO           Where to Get Your Medications      You can get these medications from any pharmacy    Bring a paper prescription for each of these medications  · ibuprofen 800 MG tablet  · senna-docusate 8.6-50 MG per tablet           Activity: pelvic rest x 6 weeks, no driving on narcotics, no lifting greater than 15 lbs  Diet: regular diet  Follow up: 1 week for BP check     Condition on discharge: good    Discharge date: 2/16/2020    Kristin Stoddard DO  Ob/Gyn Resident    Comments:  Home care and follow-up care were reviewed. Pelvic rest, and birth control were reviewed. Signs and symptoms of mastitis and post partum depression were reviewed. The patient is to notify her physician if any of these occur. The patient was counseled on secondary smoke risks and the increased risk of sudden infant death syndrome and respiratory problems to her baby with exposure. She was counseled on various alternate recommendations to decrease the exposure to secondary smoke to her children.

## 2020-02-14 NOTE — H&P
[facility]    Date: 2020       Time: 1:28 AM   Patient Name: Clayton Pollock     Patient : 1990  Room/Bed: [place] [address]    Admission Date/Time: 2020  1:04 AM      CC: contractions      HPI: Clayton Pollock is a 34 y.o. Marques Coupe at 39w6d who presents to labor and delivery complaining of contractions that started around dinner time. She states she feels them every 5-10 minutes and they are getting stronger/closer together. Patient denies any headache, visual changes, difficulty breathing, RUQ pain, N/V, F/C, and pain/swelling in lower extremities. The patient reports fetal movement is present, denies contractions, denies loss of fluid, denies vaginal bleeding. DATING:  LMP: Patient's last menstrual period was 2019 (exact date).   Estimated Date of Delivery: 2/15/20   Based on: LMP, confirmed by US at 7 3/7 weeks GA    PREGNANCY RISK FACTORS:  Patient Active Problem List   Diagnosis    Normal pregnancy in second trimester    Urinary tract infection in mother during second trimester of pregnancy        Steroids Given In This Pregnancy:  no     REVIEW OF SYSTEMS:   Constitutional: negative fever, negative chills, negative weight changes   HEENT: negative visual disturbances, negative headaches, negative dizziness, negative hearing loss  Breast: Negative breast abnormalities, negative breast lumps, negative nipple discharge  Respiratory: negative dyspnea, negative cough, negative SOB  Cardiovascular: negative chest pain,  negative palpitations, negative arrhythmia, negative syncope   Gastrointestinal: negative abdominal pain, negative RUQ pain, negative N/V, negative diarrhea, negative constipation, negative bowel changes, negative heartburn   Genitourinary: negative dysuria, negative hematuria, negative urinary incontinence, negative vaginal discharge  Dermatological: negative rash, negative pruritis, negative mole or other skin changes  Hematologic: negative bruising  Immunologic/Lymphatic: negative recent illness, negative recent sick contact  Musculoskeletal: negative back pain, negative myalgias, negative arthralgias  Neurological:  negative dizziness, negative migraines, negative seizures, negative weakness  Behavior/Psych: negative depression, negative anxiety, negative SI, negative HI        OBSTETRICAL HISTORY:   OB History    Para Term  AB Living   1 0 0 0 0 0   SAB TAB Ectopic Molar Multiple Live Births   0 0 0 0 0 0      # Outcome Date GA Lbr Galindo/2nd Weight Sex Delivery Anes PTL Lv   1 Current                PAST MEDICAL HISTORY:   has no past medical history on file. PAST SURGICAL HISTORY:   has a past surgical history that includes [place] tooth extraction. ALLERGIES:  has No Known Allergies. MEDICATIONS:  Prior to Admission medications    Medication Sig Start Date End Date Taking? Authorizing Provider   nitrofurantoin, macrocrystal-monohydrate, (MACROBID) 100 MG capsule Take 1 capsule by mouth nightly Take 1 capsule nightly for the remainder of pregnancy. Complete other macrobid script sent in first 19   Juliana Craig. Devices (BREAST PUMP) MISC Double electric 19   Roland Rush DO   Prenatal Vit-Fe Fumarate-FA (PRENATAL PO) Take by mouth    Historical Provider, MD   cephALEXin (KEFLEX) 500 MG capsule Take 1 capsule by mouth 4 times daily  Patient not taking: Reported on 2020 8/15/19   SONIA Christianson CNP       FAMILY HISTORY:  family history includes Diabetes in her paternal grandfather. SOCIAL HISTORY:   reports that she has never smoked. She has never used smokeless tobacco. She reports that she does not drink alcohol or use drugs.     VITALS:  Vitals:    20 0109 20 0215   BP: 112/85    Pulse: 103    Resp: 18    Temp: 97.3 °F (36.3 °C)    TempSrc: Oral    Weight:  197 lb (89.4 kg)   Height:  5' 11\" (1.803 m)         PHYSICAL EXAM:  Fetal Heart Monitor:  Baseline Heart Rate 161 by M mode     General appearance:  no apparent distress, alert and cooperative  HEENT: head atraumatic, normocephalic, moist mucous membranes, trachea midline  Neurologic:  alert, oriented, normal speech, no focal findings or movement disorder noted  Lungs:  No increased work of breathing, good air exchange, clear to auscultation bilaterally, no crackles or wheezing  Heart:  regular rate and rhythm and no murmur, rubs, gallops  Abdomen:  soft, gravid, non-tender, no right upper quadrant tenderness, no CVA tenderness, uterus non-tender, no signs of abruption and no signs of chorioamnionitis  Extremities:  no calf tenderness, non edematous, no varicosities, full range of motion in all four extremities, DTRs: normal  Musculoskeletal: Gross strength equal and intact throughout, no gross abnormalities, range of motion normal in hips, knees, shoulders and spine  Psychiatric: Mood appropriate, normal affect     Pelvic Exam:          LIMITED BEDSIDE US:  Position: cephalic   Placental Location: posterior  Fetal Heart Tones: Present  Fetal Movement: Present  Amniotic Fluid Index/Volume:  adequate 2x2 cm fluid pocket  EFW 7#14 in office       PRENATAL LAB RESULTS:   Blood Type/Rh: A pos  Antibody Screen: negative  Hemoglobin, Hematocrit, Platelets: 77.5 / 40.9 / 208  Rubella: immune  T.  Pallidum, IgG: non-reactive   Hepatitis B Surface Antigen: non-reactive   HIV: non-reactive   Sickle Cell Screen: not available  Gonorrhea: negative  Chlamydia: negative  Urine culture: positive, date: 19, 19  klebsiella    Negative 19    1 hour Glucose Tolerance Test: 114    Group B Strep: negative  Cystic Fibrosis Screen: negative  First Trimester Screen: patient declined  MSAFP/Multiple Markers: normal  Non-Invasive Prenatal Testing: not available  Anatomy US: posterior, 3VC, doesn't want to know (F)    ASSESSMENT & PLAN:  Laisha Lowery is a 34 y.o. female  at 37w11d IUP   - GBS negative / Rh positive / R immune   - No indication for GBS prophylaxis    Spontaneous labor    - Admit to L&D    - CEFM/TOCO   - CBC, T&S, T. Pall ordered   - Urine drug screen ordered,  Informed consent obtained. Discussed R/B/A. All questions and concerns answered. Patient verbalized understanding and agreement to plan. - IV fluid - LR @125cc/hr   - Patient may have epidural when desired    - Expectant management         Patient Active Problem List    Diagnosis Date Noted    Normal pregnancy in second trimester 09/03/2019     Declines genetics  Anatomy scan WNL follow up at [facility] as clinically indicated      Urinary tract infection in mother during second trimester of pregnancy 09/03/2019     x2 rpt UC ordered  tx sent 9/6/19 discussed with Victor Manuel scanlon with macrobid and suppression therapy started   WIll need rpt UC at [facility] in October   10/8 UC neg           Steroids given this admission: No    Risks, benefits, alternatives and possible complications have been discussed in detail with the patient. Admission, and post admission procedures and expectations were discussed in detail. All questions were answered.     Attending's Name: Dr. Venkatesh Hankins  Ob/Gyn Resident  2/14/2020, 1:28 AM

## 2020-02-14 NOTE — ANESTHESIA POSTPROCEDURE EVALUATION
Department of Anesthesiology  Postprocedure Note    Patient: Lorraine Montesinos  MRN: [id number]  [place]: 1990  Date of evaluation: 2/14/2020  Time:  5:34 PM     Procedure Summary     Date:  02/14/20 Room / Location:      Anesthesia Start:  0329 Anesthesia Stop:  0732    Procedure:  Labor Analgesia Diagnosis:      Scheduled Providers:   Responsible Provider:  Esperanza Montiel MD    Anesthesia Type:  epidural ASA Status:  2          Anesthesia Type: No value filed. Monica Phase I: Monica Score: 10    Monica Phase II: Monica Score: 10    Last vitals: Reviewed and per EMR flowsheets.        Anesthesia Post Evaluation    Patient location during evaluation: floor  Patient participation: complete - patient participated  Level of consciousness: awake and alert  Pain score: 0  Airway patency: patent  Nausea & Vomiting: no nausea and no vomiting  Complications: no  Cardiovascular status: blood pressure returned to baseline  Respiratory status: acceptable  Hydration status: euvolemic

## 2020-02-15 PROBLEM — O13.9 GESTATIONAL HYPERTENSION: Status: ACTIVE | Noted: 2020-02-15

## 2020-02-15 LAB
ABSOLUTE EOS #: 0.14 K/UL (ref 0–0.44)
ABSOLUTE IMMATURE GRANULOCYTE: 0.1 K/UL (ref 0–0.3)
ABSOLUTE LYMPH #: 2.32 K/UL (ref 1.1–3.7)
ABSOLUTE MONO #: 1.28 K/UL (ref 0.1–1.2)
ALBUMIN SERPL-MCNC: 3 G/DL (ref 3.5–5.2)
ALBUMIN/GLOBULIN RATIO: 1 (ref 1–2.5)
ALP BLD-CCNC: 159 U/L (ref 35–104)
ALT SERPL-CCNC: 15 U/L (ref 5–33)
ANION GAP SERPL CALCULATED.3IONS-SCNC: 9 MMOL/L (ref 9–17)
AST SERPL-CCNC: 28 U/L
BASOPHILS # BLD: 1 % (ref 0–2)
BASOPHILS ABSOLUTE: 0.1 K/UL (ref 0–0.2)
BILIRUB SERPL-MCNC: 0.23 MG/DL (ref 0.3–1.2)
BUN BLDV-MCNC: 5 MG/DL (ref 6–20)
BUN/CREAT BLD: ABNORMAL (ref 9–20)
CALCIUM SERPL-MCNC: 8.5 MG/DL (ref 8.6–10.4)
CHLORIDE BLD-SCNC: 109 MMOL/L (ref 98–107)
CO2: 22 MMOL/L (ref 20–31)
CREAT SERPL-MCNC: 0.54 MG/DL (ref 0.5–0.9)
CREATININE URINE: 38.7 MG/DL (ref 28–217)
DIFFERENTIAL TYPE: ABNORMAL
EOSINOPHILS RELATIVE PERCENT: 1 % (ref 1–4)
GFR AFRICAN AMERICAN: >60 ML/MIN
GFR NON-AFRICAN AMERICAN: >60 ML/MIN
GFR SERPL CREATININE-BSD FRML MDRD: ABNORMAL ML/MIN/{1.73_M2}
GFR SERPL CREATININE-BSD FRML MDRD: ABNORMAL ML/MIN/{1.73_M2}
GLUCOSE BLD-MCNC: 74 MG/DL (ref 70–99)
HCT VFR BLD CALC: 31.4 % (ref 36.3–47.1)
HEMOGLOBIN: 10 G/DL (ref 11.9–15.1)
IMMATURE GRANULOCYTES: 1 %
LYMPHOCYTES # BLD: 16 % (ref 24–43)
MCH RBC QN AUTO: 29.7 PG (ref 25.2–33.5)
MCHC RBC AUTO-ENTMCNC: 31.8 G/DL (ref 28.4–34.8)
MCV RBC AUTO: 93.2 FL (ref 82.6–102.9)
MONOCYTES # BLD: 9 % (ref 3–12)
NRBC AUTOMATED: 0 PER 100 WBC
PDW BLD-RTO: 13.5 % (ref 11.8–14.4)
PLATELET # BLD: 206 K/UL (ref 138–453)
PLATELET ESTIMATE: ABNORMAL
PMV BLD AUTO: 12 FL (ref 8.1–13.5)
POTASSIUM SERPL-SCNC: 3.7 MMOL/L (ref 3.7–5.3)
RBC # BLD: 3.37 M/UL (ref 3.95–5.11)
RBC # BLD: ABNORMAL 10*6/UL
SEG NEUTROPHILS: 72 % (ref 36–65)
SEGMENTED NEUTROPHILS ABSOLUTE COUNT: 10.87 K/UL (ref 1.5–8.1)
SODIUM BLD-SCNC: 140 MMOL/L (ref 135–144)
TOTAL PROTEIN, URINE: 6 MG/DL
TOTAL PROTEIN: 6 G/DL (ref 6.4–8.3)
URINE TOTAL PROTEIN CREATININE RATIO: 0.16 (ref 0–0.2)
WBC # BLD: 14.8 K/UL (ref 3.5–11.3)
WBC # BLD: ABNORMAL 10*3/UL

## 2020-02-15 PROCEDURE — 84156 ASSAY OF PROTEIN URINE: CPT

## 2020-02-15 PROCEDURE — 1220000000 HC SEMI PRIVATE OB R&B

## 2020-02-15 PROCEDURE — 80053 COMPREHEN METABOLIC PANEL: CPT

## 2020-02-15 PROCEDURE — 82570 ASSAY OF URINE CREATININE: CPT

## 2020-02-15 PROCEDURE — 6370000000 HC RX 637 (ALT 250 FOR IP): Performed by: STUDENT IN AN ORGANIZED HEALTH CARE EDUCATION/TRAINING PROGRAM

## 2020-02-15 PROCEDURE — 85025 COMPLETE CBC W/AUTO DIFF WBC: CPT

## 2020-02-15 PROCEDURE — 36415 COLL VENOUS BLD VENIPUNCTURE: CPT

## 2020-02-15 RX ORDER — IBUPROFEN 800 MG/1
800 TABLET ORAL EVERY 8 HOURS PRN
Qty: 30 TABLET | Refills: 0 | Status: SHIPPED | OUTPATIENT
Start: 2020-02-15 | End: 2020-07-29

## 2020-02-15 RX ORDER — SENNA AND DOCUSATE SODIUM 50; 8.6 MG/1; MG/1
1 TABLET, FILM COATED ORAL 2 TIMES DAILY
Status: DISCONTINUED | OUTPATIENT
Start: 2020-02-15 | End: 2020-02-16 | Stop reason: HOSPADM

## 2020-02-15 RX ORDER — PSEUDOEPHEDRINE HCL 30 MG
100 TABLET ORAL 2 TIMES DAILY
Qty: 60 CAPSULE | Refills: 0 | Status: SHIPPED | OUTPATIENT
Start: 2020-02-15 | End: 2020-02-15 | Stop reason: HOSPADM

## 2020-02-15 RX ORDER — AMOXICILLIN 250 MG
2 CAPSULE ORAL 2 TIMES DAILY
Qty: 60 TABLET | Refills: 0 | Status: SHIPPED | OUTPATIENT
Start: 2020-02-15 | End: 2020-07-29

## 2020-02-15 RX ADMIN — IBUPROFEN 800 MG: 800 TABLET, FILM COATED ORAL at 20:44

## 2020-02-15 RX ADMIN — IBUPROFEN 800 MG: 800 TABLET, FILM COATED ORAL at 08:21

## 2020-02-15 RX ADMIN — DOCUSATE SODIUM 100 MG: 100 CAPSULE, LIQUID FILLED ORAL at 08:21

## 2020-02-15 RX ADMIN — SENNOSIDES AND DOCUSATE SODIUM 1 TABLET: 8.6; 5 TABLET ORAL at 20:44

## 2020-02-15 ASSESSMENT — PAIN DESCRIPTION - DESCRIPTORS: DESCRIPTORS: DISCOMFORT;SORE

## 2020-02-15 ASSESSMENT — PAIN DESCRIPTION - PAIN TYPE: TYPE: ACUTE PAIN

## 2020-02-15 ASSESSMENT — PAIN SCALES - GENERAL
PAINLEVEL_OUTOF10: 4

## 2020-02-15 ASSESSMENT — PAIN DESCRIPTION - FREQUENCY: FREQUENCY: CONTINUOUS

## 2020-02-15 ASSESSMENT — PAIN - FUNCTIONAL ASSESSMENT: PAIN_FUNCTIONAL_ASSESSMENT: ACTIVITIES ARE NOT PREVENTED

## 2020-02-15 ASSESSMENT — PAIN DESCRIPTION - LOCATION: LOCATION: PERINEUM

## 2020-02-15 NOTE — PROGRESS NOTES
POST PARTUM DAY # 1    Марина Gardner is a 34 y.o. female  This patient was seen & examined today.  20. Her pregnancy was complicated by:   Patient Active Problem List   Diagnosis    Normal pregnancy in second trimester    Urinary tract infection in mother during second trimester of pregnancy     20 F Apg 8/9 Wt 8#0    Gestational hypertension       Today she is doing well without any chief complaint. Her lochia is light. She denies chest pain, shortness of breath, headache, lightheadedness and blurred vision. She is breast feeding and she denies any breast tenderness. She is ambulating well. Her voiding pattern is normal. I reviewed signs and symptoms of post partum depression with the patient, she currently denies any of these symptoms. She is tolerating solids. Patient reports she would like to go as soon as possible today. Vital Signs:  Vitals:    20 1207 20 1600 20 1948 20 2310   BP: (!) 117/59 112/68 126/79 (!) 131/90   Pulse: 84 78 95 76   Resp:    Temp: 98.6 °F (37 °C) 98.2 °F (36.8 °C) 97.9 °F (36.6 °C) 98 °F (36.7 °C)   TempSrc:   Oral Oral   Weight:       Height:             Physical Exam:  General:  no apparent distress, alert and cooperative  Neurologic:  alert, oriented, normal speech, no focal findings or movement disorder noted  Lungs:  No increased work of breathing, good air exchange, clear to auscultation bilaterally, no crackles or wheezing  Heart:  Normal apical impulse, regular rate and rhythm, normal S1 and S2, no S3 or S4, and no murmur noted    Abdomen: abdomen soft, non-distended, non-tender  Fundus: non-tender, firm, below umbilicus  Extremities:  no calf tenderness, non edematous    Lab:  Lab Results   Component Value Date    HGB 11.8 (L) 2020     Lab Results   Component Value Date    HCT 38.0 2020       Assessment/Plan:  1.  Марина Gardner is a Clayton Franco PPD # 1 s/p  on 20   - Doing well, hypertensive otherwise to the baby being delivered through the vagina. The amount of time that labor can take varies greatly. Labor for the average first-born baby is about 12 hours. Usually your hospital stay after a routine delivery is no more than two nights. Some new mothers go home the same day. Recovery from childbirth varies depending upon whether you had an episiotomy (an incision in the perineum, the area between your vaginal opening and your anus), the duration of labor and delivery, and the amount of rest you get. In general, it takes about 6-8 weeks for a woman's body to recover from childbirth. What You Will Need   Along with your medications, you will need the following:   · Sanitary pads    · Nursing pads    · Witch hazel pads    · Sitz bath    Steps to [address] will want to arrange for transportation home for you and your baby. The baby will need a car seat. You will receive instructions in the hospital for breastfeeding and taking care of the perineum area. You may use ointment for cracked nipples or warm water rinses to your perineum. · You will need to wear sanitary pads for about six weeks after delivery. · If you had an episiotomy or vaginal tear, you will be sent home with a plastic squirt bottle. Fill it with warm water and squirt over the vaginal and anal area every time you urinate and defecate. · Warm baths can be soothing to healing tissues. · Apply warm or cold cloths to sore breasts. · Apply ointment to cracked nipples. · Use nursing pads for leaky breast.    · Apply witch hazel pads to sore perineum (area between vagina and anus). · Ask your doctor about when it is safe for you to shower or bathe. · Sit in a sitz bath to soothe sore perineum and/or hemorrhoids. A sitz bath is soaking the hip and buttocks area in warm water. You can buy a plastic sitz bath at most [employer]. It will fit on your toilet. You can also use your bathtub.     Diet    Eat a well balanced, Try sleeping when the baby sleeps. · Ask your doctor when you can resume sexual relations. If you have not done so already, talk to your doctor about birth control options. · If you are breastfeeding, consider a breast pump. · Call your obstetrician and/or pediatrician for any questions that arise. · Understand the changes your body is going through as it recovers from childbirth:   ¨ Hot and cold flashes as your body adjusts to new hormone and blood flow levels   ¨ Urinary or fecal incontinence due to stretched muscles   ¨ After pains from uterine contractions as the uterus shrinks   ¨ Vaginal bleeding (called lochia), which is heavier than your period (generally stops within two months)   ¨ Baby blues, feelings of irritability, sadness, crying, or anxiety. Postpartum depression is more severe, occurring in 10%-20% of new moms. If you experience such symptoms, contact your doctor. · Consider joining a support group for new mothers. You can get encouragement and learn parenting strategies. Follow-up   Schedule a follow-up appointment as directed by your doctor. Call Your Doctor If Any of the Following Occurs   It is important for you to monitor your recovery once you leave the hospital. That way, you can alert your doctor to any problems immediately.  If any of the following occurs, call your doctor:   · Signs of infection, including fever and chills    · Increased bleeding: soaking more than one sanitary pad an hour    · Wounds that become red, swollen or drain pus    · Vaginal discharge that smells foul    · New pain, swelling, or tenderness in your legs    · Pain that you can't control with the medications you've been given    · Pain, burning, urgency or frequency of urination, or persistent bleeding in the urine    · Cough, shortness of breath, or chest pain    · Depression, suicidal thoughts, or feelings of harming your baby    · Breasts that are hot, red and accompanied by fever    · Any cracking or bleeding from the nipple or areola (the dark-colored area of the breast)      In case of an emergency, call 911 immediately.        Tari Villarreal, DO

## 2020-02-16 VITALS
HEART RATE: 80 BPM | TEMPERATURE: 97.4 F | RESPIRATION RATE: 20 BRPM | HEIGHT: 71 IN | DIASTOLIC BLOOD PRESSURE: 85 MMHG | WEIGHT: 197 LBS | SYSTOLIC BLOOD PRESSURE: 133 MMHG | BODY MASS INDEX: 27.58 KG/M2

## 2020-02-16 PROCEDURE — 59400 OBSTETRICAL CARE: CPT | Performed by: OBSTETRICS & GYNECOLOGY

## 2020-02-16 PROCEDURE — 6370000000 HC RX 637 (ALT 250 FOR IP): Performed by: STUDENT IN AN ORGANIZED HEALTH CARE EDUCATION/TRAINING PROGRAM

## 2020-02-16 RX ADMIN — SENNOSIDES AND DOCUSATE SODIUM 1 TABLET: 8.6; 5 TABLET ORAL at 09:01

## 2020-02-16 RX ADMIN — IBUPROFEN 800 MG: 800 TABLET, FILM COATED ORAL at 06:00

## 2020-02-16 ASSESSMENT — PAIN SCALES - GENERAL: PAINLEVEL_OUTOF10: 3

## 2020-02-16 NOTE — PROGRESS NOTES
CLINICAL PHARMACY NOTE: MEDS TO [address] Select Patient?: Yes  Total # of Prescriptions Filled: 0   The following medications were delivered to the patient:  · Ibuprofen   · colace  Total # of Interventions Completed: 1  Time Spent (min): 30    Additional Documentation:  PATIENT DID NOT GET DELIVERY FOR M2B  At 10:11am called floor to see if patient was going home - upon checking their status at 945am they had DC but no med rec complete. Called to see if patient wanted it delivered even though there was no med rec completed, but they had stated patient already left. No RN called to get meds or ask if they were ready.

## 2020-02-16 NOTE — PROGRESS NOTES
POST PARTUM DAY # 2    Ericka Davis is a 34 y.o. female  This patient was seen & examined today. Her pregnancy was complicated by:   Patient Active Problem List   Diagnosis    Normal pregnancy in second trimester    Urinary tract infection in mother during second trimester of pregnancy     20 F Apg 8/9 Wt 8#0    Gestational hypertension       Today she is doing well without any chief complaint. Her lochia is light. She denies chest pain, shortness of breath, headache, lightheadedness, blurred vision and peripheral edema. She is  breast feeding and she denies any breast tenderness. She is ambulating well. Her voiding pattern is normal. I reviewed signs and symptoms of post partum depression with the patient, she currently denies any of these symptoms. She is tolerating solids. Vital Signs:  Vitals:    20 2310 02/15/20 0823 02/15/20 1202 02/15/20 2045   BP: (!) 131/90 135/86 127/82 135/82   Pulse: 76 95 103 98   Resp: 18 16 18 20   Temp: 98 °F (36.7 °C) 97.7 °F (36.5 °C)  99 °F (37.2 °C)   TempSrc: Oral Oral  Oral   Weight:       Height:           Physical Exam:  General:  no apparent distress, alert and cooperative  Neurologic:  alert, oriented, normal speech, no focal findings or movement disorder noted  Lungs:  No increased work of breathing, good air exchange, clear to auscultation bilaterally, no crackles or wheezing  Heart:  regular rate and rhythm    Abdomen: abdomen soft, non-distended, non-tender  Fundus: non-tender, normal size, firm, below umbilicus  Extremities:  no calf tenderness, non edematous    Lab:  Lab Results   Component Value Date    HGB 10.0 (L) 02/15/2020     Lab Results   Component Value Date    HCT 31.4 (L) 02/15/2020       Assessment/Plan:  1. Ericka Davis is a Darreld Monk PPD # 2 s/p    - Doing well, VSS   - Female infant in [address]   - Encourage ambulation   - Postpartum Hgb/Hct completed: Hgb 10.  No s/s of anemia    - Pain control: motrin and

## 2020-02-17 LAB — SURGICAL PATHOLOGY REPORT: NORMAL

## 2020-07-27 ENCOUNTER — HOSPITAL ENCOUNTER (OUTPATIENT)
Age: 30
Discharge: HOME | End: 2020-07-27
Payer: COMMERCIAL

## 2020-07-27 ENCOUNTER — NURSE TRIAGE (OUTPATIENT)
Dept: OTHER | Facility: CLINIC | Age: 30
End: 2020-07-27

## 2020-07-27 PROCEDURE — U0003 INFECTIOUS AGENT DETECTION BY NUCLEIC ACID (DNA OR RNA); SEVERE ACUTE RESPIRATORY SYNDROME CORONAVIRUS 2 (SARS-COV-2) (CORONAVIRUS DISEASE [COVID-19]), AMPLIFIED PROBE TECHNIQUE, MAKING USE OF HIGH THROUGHPUT TECHNOLOGIES AS DESCRIBED BY CMS-2020-01-R: HCPCS

## 2020-07-28 LAB
SARS-COV-2, PCR: NORMAL
SARS-COV-2, RAPID: NORMAL
SARS-COV-2: NOT DETECTED
SOURCE: NORMAL

## 2020-07-29 ENCOUNTER — TELEMEDICINE (OUTPATIENT)
Dept: FAMILY MEDICINE CLINIC | Age: 30
End: 2020-07-29
Payer: COMMERCIAL

## 2020-07-29 PROCEDURE — 99202 OFFICE O/P NEW SF 15 MIN: CPT | Performed by: FAMILY MEDICINE

## 2020-07-29 ASSESSMENT — ENCOUNTER SYMPTOMS
TROUBLE SWALLOWING: 0
SORE THROAT: 0
ABDOMINAL PAIN: 0
NAUSEA: 0
EYE DISCHARGE: 0
EYE REDNESS: 0
SINUS PRESSURE: 0
VOMITING: 0
RHINORRHEA: 1
WHEEZING: 0
SHORTNESS OF BREATH: 0
SINUS PAIN: 0
DIARRHEA: 0
CONSTIPATION: 0
COUGH: 0

## 2020-07-29 NOTE — LETTER
[facility] department of [facility]  Phone: [phone]  Fax: Aly Park DO      July 29, 2020    Patient:   Otf Jimenez  Date of Birth   1990  Date of visit   7/29/2020        To Whom it May Concern:      Otf Jimenez was seen in my clinic on 7/29/2020. Please excuse from work 7/25/20 through 7/29/2020 due to acute illness. Covid testing negative. She may return to work on 7/30/2020 without restriction. If you have any questions or concerns, please don't hesitate to call.       Sincerely,      Rossy Man, DO

## 2020-07-29 NOTE — PROGRESS NOTES
2020    TELEHEALTH EVALUATION -- Audio/Visual (During ERPZT-20 public health emergency)    HPI:    Margaret Hernández (:  1990) has requested an audio/video evaluation for the following concern(s):    Patient was seen today via video visit in follow-up of acute respiratory symptoms. 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 As a result they did recommend that she get COVID tested and has been off work since 67 when she developed symptoms. Her COVID test did come back negative and she is feeling much better overall. All her upper symptoms have improved overall. Not getting any chest congestion sinus pressure or pain, fever chills, sore throat, ear pain. Does need clearance to return to work duties. Is scheduled to work tomorrow morning at 8 AM.  No other acute symptoms reported at this time. Review of Systems   Constitutional: Negative for chills, fatigue and fever. HENT: Positive for congestion (still slight congestion), postnasal drip and rhinorrhea. Negative for ear pain, sinus pressure, sinus pain, sore throat and trouble swallowing. Eyes: Negative for discharge and redness. Respiratory: Negative for cough, shortness of breath and wheezing. Cardiovascular: Negative for chest pain. Gastrointestinal: Negative for abdominal pain, constipation, diarrhea, nausea and vomiting. Genitourinary: Negative for dysuria, flank pain, frequency and urgency. Musculoskeletal: Negative for arthralgias, myalgias and neck pain. Skin: Negative for rash and wound. Allergic/Immunologic: Negative for environmental allergies. Neurological: Negative for dizziness, weakness, light-headedness and headaches. Hematological: Negative for adenopathy. Psychiatric/Behavioral: Negative. Prior to Visit Medications    Medication Sig Taking? Authorizing Provider   Misc.  Devices (BREAST PUMP) MISC Double electric Yes Jose Rush DO       Social History     Tobacco Use    Smoking status: Never Smoker    Smokeless tobacco: Never Used   Substance Use Topics    Alcohol use: No    Drug use: No        No Known Allergies, History reviewed. No pertinent past medical history. ,   Past Surgical History:   Procedure Laterality Date    WISDOM TOOTH EXTRACTION         Physical Exam  Vitals signs and nursing note reviewed. Constitutional:       General: She is not in acute distress. Appearance: Normal appearance. HENT:      Head: Normocephalic and atraumatic. Right Ear: External ear normal.      Left Ear: External ear normal.      Nose: Nose normal. No congestion or rhinorrhea. Mouth/Throat:      Mouth: Mucous membranes are moist.   Eyes:      General:         Right eye: No discharge. Left eye: No discharge. Extraocular Movements: Extraocular movements intact. Conjunctiva/sclera: Conjunctivae normal.   Neck:      Musculoskeletal: Normal range of motion. Pulmonary:      Effort: Pulmonary effort is normal.   Skin:     Findings: No erythema or rash. Neurological:      Mental Status: She is alert and oriented to person, place, and time. Mental status is at baseline. ASSESSMENT/PLAN:  Encounter Diagnosis   Name Primary?  Viral URI Yes     At this time patient symptoms have nearly completely resolved except for some mild residual sinus congestion and drainage. Do feel she is cleared to return to work duties. Has had negative COVID testing. Can continue with symptomatic treatment if needed and would continue with plenty of rest and adequate fluid intake. Return  if no improvement in symptoms or if any further symptoms arise. Ricardo Moore is a 31 y.o. female being evaluated by a Virtual Visit (video visit) encounter to address concerns as mentioned above. A caregiver was present when appropriate.  Due to this being a TeleHealth encounter (During KHOTN-51 public health emergency), evaluation of

## 2020-09-15 ENCOUNTER — EMPLOYEE WELLNESS (OUTPATIENT)
Dept: OTHER | Age: 30
End: 2020-09-15

## 2020-09-15 LAB
CHOLESTEROL/HDL RATIO: 2.5
CHOLESTEROL: 175 MG/DL
GLUCOSE BLD-MCNC: 80 MG/DL (ref 70–99)
HDLC SERPL-MCNC: 69 MG/DL
LDL CHOLESTEROL: 97 MG/DL (ref 0–130)
PATIENT FASTING?: YES
TRIGL SERPL-MCNC: 46 MG/DL
VLDLC SERPL CALC-MCNC: NORMAL MG/DL (ref 1–30)

## 2020-10-19 VITALS — WEIGHT: 177 LBS | BODY MASS INDEX: 24.69 KG/M2

## 2021-04-20 ENCOUNTER — E-VISIT (OUTPATIENT)
Dept: FAMILY MEDICINE CLINIC | Age: 31
End: 2021-04-20
Payer: COMMERCIAL

## 2021-04-20 DIAGNOSIS — J01.90 ACUTE NON-RECURRENT SINUSITIS, UNSPECIFIED LOCATION: Primary | ICD-10-CM

## 2021-04-20 PROCEDURE — 99422 OL DIG E/M SVC 11-20 MIN: CPT | Performed by: PHYSICIAN ASSISTANT

## 2021-04-20 RX ORDER — AZITHROMYCIN 250 MG/1
250 TABLET, FILM COATED ORAL SEE ADMIN INSTRUCTIONS
Qty: 6 TABLET | Refills: 0 | Status: SHIPPED | OUTPATIENT
Start: 2021-04-20 | End: 2021-04-25

## 2021-04-20 ASSESSMENT — LIFESTYLE VARIABLES: SMOKING_STATUS: NO, I'VE NEVER SMOKED

## 2021-04-20 NOTE — PROGRESS NOTES
21  Anaid Mathis : 1990 Sex: female  Age: 31 y.o. No chief complaint on file. HPI:  31 y.o. female presents for acute visit due to sinus/cough/cold. The patient has had these sinus congestion, drainage ongoing since 2021. The patient has had some pressure and pain around face, upper teeth. The patient has had a sore throat. The patient has noted some swollen glands in the neck. The patient is sneezing. No significant cough. The patient has had some hoarseness. Ears seem to be itching. The patient also has noted some dry eyes that changed to watering. The patient does not have any body aches, stiff neck, nausea, vomiting, dizziness. No swelling of mouth, tongue or difficulty swallowing. No chest pain or shortness of breath. No new rashes. The patient started with these symptoms on 2021. The patient has not had any fevers. The patient is not a smoker. No history of immunocompromise state. Patient denies pregnancy or breast-feeding. The patient has tried over-the-counter Claritin without much improvement. History:    Current Outpatient Medications:     Misc. Devices (BREAST PUMP) MISC, Double electric, Disp: 1 each, Rfl: 0    No Known Allergies    No past medical history on file. Past Surgical History:   Procedure Laterality Date    WISDOM TOOTH EXTRACTION       Family History   Problem Relation Age of Onset    Diabetes Paternal Grandfather      Social History     Tobacco History     Smoking Status  Never Smoker    Smokeless Tobacco Use  Never Used               No vital signs for this E-visit    Past medical history reviewed. Allergies reviewed. Medications reviewed. The patient had a questionnaire reviewed does seem to be consistent with upper respiratory sinus symptoms. The patient will be treated with a azithromycin. According to the patient has tolerated this and worked well in the past.  The patient will be encouraged to use a probiotic. Patient may use over-the-counter decongestants. Nasal spray. If the symptoms do not improve would recommend formal evaluation in the office with chest x-ray, further studies. Assessment and Plan:  Diagnoses and all orders for this visit:    Acute non-recurrent sinusitis, unspecified location  -     azithromycin (ZITHROMAX) 250 MG tablet; Take 1 tablet by mouth See Admin Instructions for 5 days 500mg on day 1 followed by 250mg on days 2 - 5        11-20 minutes were spent on the digital evaluation and management of this patient.     Richard Trinidad III [place]  4/20/21  9:50 AM

## 2021-11-15 ENCOUNTER — HOSPITAL ENCOUNTER (OUTPATIENT)
Age: 31
Discharge: HOME | End: 2021-11-15

## 2021-11-15 PROCEDURE — 86480 TB TEST CELL IMMUN MEASURE: CPT

## 2021-11-17 LAB
QUANTI TB GOLD PLUS: NEGATIVE
QUANTI TB1 MINUS NIL: 0.01 IU/ML (ref 0–0.34)
QUANTI TB2 MINUS NIL: 0.01 IU/ML (ref 0–0.34)
QUANTIFERON MITOGEN: >10 IU/ML
QUANTIFERON NIL: 0.01 IU/ML

## 2022-06-23 ENCOUNTER — TELEPHONE (OUTPATIENT)
Dept: OBGYN CLINIC | Age: 32
End: 2022-06-23

## 2022-06-23 NOTE — TELEPHONE ENCOUNTER
Ob education/Intake (telephone Visit): IPV scheduled on 2022. Planned pregnancy. Father of Shahram Lira is Charles Arteaga- spouse. Father of baby Ethnicity:  Non--                           White. Patient Ethnicity:  Non-. White    Pt occupation:  RN    GsPs:  Gr. 2  Para 1    Blood Type:   A pos.  Section History/Vaginal Delivery History-  Vaginal delivery  20:  Patient delivered @ 40 wks  Female 8.0lbs. LMP-  2022  Cycles regular    BCP at Conception:  Patient denies:    Any medications/drugs/alcohol at conception/currently: Only medications patient has been taking are prenatal vitamins. Tobacco abuse:  Patient denies. Substance Abuse History- Denies. Depression/Anxiety History-Denies. Domestic Abuse History-Denies. Pets in home:  Dog. No Cats- explained re: cat litter boxes and toxoplasmosis. Last Pap:  20    Hx abnormal PAP: Patient denies. Pt BMI:  25.0    Hgt 5'11  Wgt 180    Patient PMH         Only surgery was for wisdom teeth. History of    Any history of genetic or chromosomal aberrations, spina bifida or abdominal wall defects; omphalocele's or gastroschisis in herself the father to be or their respective families:  Patient Denies    Hx Hypothyroidism: Denies. Hx preeclampsia or HTN: Denies. Hx PPD: Denies. Hx Diabetes: No.    Hx GDM: No.    Hx STI: Denies. ( hx HSV 1 or 2)  No    COVID Vaccine: Yes- Pfizer and booster    Flu Vaccine: Yes in the     High Risk Factor Screening for this Pregnancy:    · Age greater than 28 at delivery: No    · History of  delivery: No    · History of diabetes (gestational or outside of pregnancy): No  , if yes on medications N/A    History of cHTN  No.    Screening for pregestational diabetes:    ? BMI greater than 30: NO   If Yes, need early glucola   N/A    ?  BMI greater than 25 ( Americans greater than 23): No   If Yes, need 1 more risk factor. N/A. § First-degree relative with diabetes: No    § High-risk race of ethnicity (eg, , , , [place], [facility]): No.    § Previously given birth to an infant weighing 1ext81kd (4000g) or more: No.    § History of hypertension: No.    § HDL < 35mg/dL and/or a triglyceride level greater than 250 mg/dL: No.    § History of polycystic ovarian syndrome: No.    § A1c greater than or equal to 5.7%: unknown. Review :  [facility]   Discussed , call schedule and on call delivery, how to reach Ob/Gyn afterhours- Pt verb understanding

## 2022-07-06 ENCOUNTER — INITIAL PRENATAL (OUTPATIENT)
Dept: OBGYN CLINIC | Age: 32
End: 2022-07-06

## 2022-07-06 ENCOUNTER — HOSPITAL ENCOUNTER (OUTPATIENT)
Age: 32
Setting detail: SPECIMEN
Discharge: HOME | End: 2022-07-06

## 2022-07-06 VITALS — WEIGHT: 181 LBS | DIASTOLIC BLOOD PRESSURE: 62 MMHG | BODY MASS INDEX: 25.24 KG/M2 | SYSTOLIC BLOOD PRESSURE: 118 MMHG

## 2022-07-06 DIAGNOSIS — Z34.90 NORMAL REPEAT PREGNANCY, ANTEPARTUM: Primary | ICD-10-CM

## 2022-07-06 DIAGNOSIS — Z3A.09 9 WEEKS GESTATION OF PREGNANCY: ICD-10-CM

## 2022-07-06 DIAGNOSIS — Z87.440 HISTORY OF UTI: ICD-10-CM

## 2022-07-06 PROBLEM — O13.9 GESTATIONAL HYPERTENSION: Status: RESOLVED | Noted: 2020-02-15 | Resolved: 2022-07-06

## 2022-07-06 PROBLEM — Z34.92 NORMAL PREGNANCY IN SECOND TRIMESTER: Status: RESOLVED | Noted: 2019-09-03 | Resolved: 2022-07-06

## 2022-07-06 PROBLEM — O23.42 URINARY TRACT INFECTION IN MOTHER DURING SECOND TRIMESTER OF PREGNANCY: Status: RESOLVED | Noted: 2019-09-03 | Resolved: 2022-07-06

## 2022-07-06 PROCEDURE — 0502F SUBSEQUENT PRENATAL CARE: CPT | Performed by: ADVANCED PRACTICE MIDWIFE

## 2022-07-06 NOTE — PROGRESS NOTES
[facility],[address] OB/GYN [address]  [address] [address]  Dept: [phone]    New Obstetric Intake     Subjective:    Patient Juice Singh  Patient Age: 29 y.o. Date of Visit: 2022  Patient's estimated gestational age at visit: 9w6d      Any Concerns Today: no  Patient reports no complaints. She denies spotting, cramping or pain. OB History        2    Para   1    Term   1       0    AB   0    Living   1       SAB   0    IAB   0    Ectopic   0    Molar   0    Multiple   0    Live Births   1                Information about this pregnancy:    Planned Pregnancy: Yes, Accepting: Yes   Father of Baby: Jhon Vergara and is involved with the pregnancy   Patient's last menstrual period was 2022., Regular menses: Yes   Date of Last Pap Smear:  normal   On Birth Control at Time of Conception: no   Early Dating Ultrasound: ordered   Desires first trimester screening: No   Desires CF/SMA/FragileX screening: No    Risk Screening   Patient Occupation: RN, Environmental/Work Hazards:  Yes   Smoker: No   History of Substance Abuse: no   History of Sexual Abuse: no   Current of past history of intimate partner violence: no   Teratogen Exposure since finding out pregnant: no   Pets at home: yes - 1 dog    Infection History:   Personal History of Chicken Pox or varicella vaccination: Yes   Agreeable to flu shot: Yes   Agreeable to tdap: Yes   Exposed to TB or live with someone with TB: no   Patient or partner history of genital herpes: no   Rash or viral illness since last menstrual period: no   Prior GBS-infected child: no   History of sexually transmitted infection(s) (STIs): no    Any recent travel within the last 3 months out of the country: no, Partner: no    Previous Birth History:    Vaginal Birth: yes    Birth: no   Any previous birth complications: no   History of hemorrhage during/after birth: no    High Risk Factor Screening for this Pregnancy:   Age greater than 28 at delivery: No   History of  delivery: no   History of diabetes (gestational or outside of pregnancy): No, On medications: No   Screening for pregestational diabetes:   o BMI greater than 30: No. If Yes, need early glucola   History of Hypertension: No, On medications: NA   History of bleeding disorders: No   History of migraine headaches: No  See Epic Navigator for further genetic and risk screening. Objective:  /62   Wt 181 lb (82.1 kg)   LMP 2022   BMI 25.24 kg/m²   Body mass index is 25.24 kg/m². Physical Exam  Vitals reviewed. Constitutional:       General: She is not in acute distress. Appearance: She is well-developed. She is not diaphoretic. Neck:      Thyroid: No thyromegaly or thyroid tenderness. Cardiovascular:      Rate and Rhythm: Normal rate and regular rhythm. Heart sounds: Normal heart sounds. Pulmonary:      Effort: Pulmonary effort is normal. No respiratory distress. Breath sounds: Normal breath sounds. Abdominal:      General: There is no distension. Palpations: Abdomen is soft. Tenderness: There is no abdominal tenderness. Genitourinary:     Labia:         Right: No rash, tenderness or lesion. Left: No rash, tenderness or lesion. Vagina: No signs of injury. No vaginal discharge. Cervix: No cervical motion tenderness (pap obtained without difficulty) or friability. Musculoskeletal:         General: Normal range of motion. Cervical back: Normal range of motion. Skin:     General: Skin is warm and dry. Neurological:      Mental Status: She is alert and oriented to person, place, and time. Mental status is at baseline. Psychiatric:         Behavior: Behavior normal.         Thought Content:  Thought content normal.         Judgment: Judgment normal.         Chaperone for Intimate Exam   Chaperone was offered as part of the rooming process. Patient declined and agrees to continue with exam without a chaperone.  Chaperone: n/a    Mother's Ethnicity:   Father's Ethnicity:       Assessment/Plan:  Pregnancy at 11w9d    Role of ultrasound in pregnancy discussed; fetal survey: results reviewed   Due date has been established and is based off of LMP   She was counseled on office policies. She was educated about the anticipated course of prenatal care.  Warning signs were reviewed.  The patient was counseled on drug screening, HIV, Cystic Fibrosis, SMA and Sickle Cell disease, as appropriate.   o She verbally consented to HIV testing and drug screening.  She was counseled on Toxoplasmosis/Listeriosis (cats/raw meat).  She denies tobacco use. The patient was counseled on the risks of tobacco abuse, both maternal and fetal. She was instructed to stop smoking if currently using tobacco. Morbidity, mortality, and cessation programs were reviewed. The risks include but are not limited to increased risks of  labor,  delivery, premature rupture of membranes, intrauterine growth restriction, intrauterine fetal demise and abruptio placenta. Secondary smoke risks were also reviewed. Increases in cancer, respiratory problems, and sudden infant death syndrome were reviewed as well.  The patient was informed of a 2-4% risk of congenital anomalies in the general population. She was questioned in detail regarding any genetic misnomer history, chromosomal abnormalities, or learning disabilities in herself, the father of the baby or their families. She denies any history as stated above.  Discussed in detail referral for genetic screening through [facility].  Discussed in detail referral/orders for  for 1st trimester screen vs NIPSinfo provided for screening   Discussed with patient that if they proceed with the NIPs testing then they will be opting out of 1st trimester screening which can provide information in regards to placental health, congenital heart defects, metabolic abnormalities, and anatomic abnormalities. She verbalizes understanding and would like to proceed with: Declines    Route of delivery and counseling on vaginal, operative vaginal, and  sections were discussed. Further counseling will be handled by the provider at subsequent visits.  Encouraged well-balanced diet, plenty of rest when needed, prenatal vitamins daily and walking for exercise. Discussed self-help for nausea, avoiding OTC medications until consulting provider or pharmacist, other than Tylenol PRN, minimal caffeine (1-2 cups daily) and avoiding alcohol. Discussed exercise safety in pregnancy. 1. Normal repeat pregnancy, antepartum  - Prenatal Profile I; Future  - Varicella Zoster Antibody, IgG; Future  - Hepatitis C Antibody; Future  - C.trachomatis N.gonorrhoeae DNA, Urine; Future  - Culture, Urine; Future  - Prenatal type and screen; Future  - Ambulatory referral to Maternal Fetal  - HIV Screen; Future  - Urine Drug Screen; Future  - PAP Smear; Future    2. 9 weeks gestation of pregnancy  - Prenatal Profile I; Future  - Varicella Zoster Antibody, IgG; Future  - Hepatitis C Antibody; Future  - C.trachomatis N.gonorrhoeae DNA, Urine; Future  - Culture, Urine; Future  - Prenatal type and screen; Future  - Ambulatory referral to Maternal Fetal  - HIV Screen; Future  - Urine Drug Screen; Future  - PAP Smear; Future    3. History of UTI  - Urine culture sent at visit      Upon completion of the visit all questions were answered. ROS was done and is negative except as documented in HPI. History was reviewed as documented on Epic Navigator.     The patient, Will Odonnell, was seen with a total time spent of 25 minutes for the visit on this date of service by the [facility]  The time component involved both face-to-face (counseling and education) and non face-to-face time (care coordination), spent in determining the total time component. Return in about 4 weeks (around 8/3/2022) for Return OB.     Electronically Signed by: Radha Xie

## 2022-07-07 ENCOUNTER — HOSPITAL ENCOUNTER (OUTPATIENT)
Age: 32
Discharge: HOME | End: 2022-07-07
Payer: COMMERCIAL

## 2022-07-07 DIAGNOSIS — Z3A.09 9 WEEKS GESTATION OF PREGNANCY: ICD-10-CM

## 2022-07-07 DIAGNOSIS — Z34.90 NORMAL REPEAT PREGNANCY, ANTEPARTUM: ICD-10-CM

## 2022-07-07 LAB
ABO/RH: NORMAL
AMPHETAMINE SCREEN URINE: NEGATIVE
ANTIBODY SCREEN: NEGATIVE
BARBITURATE SCREEN URINE: NEGATIVE
BENZODIAZEPINE SCREEN, URINE: NEGATIVE
CANNABINOID SCREEN URINE: NEGATIVE
COCAINE METABOLITE, URINE: NEGATIVE
HEPATITIS C ANTIBODY: NONREACTIVE
METHADONE SCREEN, URINE: NEGATIVE
OPIATES, URINE: NEGATIVE
OXYCODONE SCREEN URINE: NEGATIVE
PHENCYCLIDINE, URINE: NEGATIVE
TEST INFORMATION: NORMAL

## 2022-07-07 PROCEDURE — 36415 COLL VENOUS BLD VENIPUNCTURE: CPT

## 2022-07-07 PROCEDURE — 85025 COMPLETE CBC W/AUTO DIFF WBC: CPT

## 2022-07-07 PROCEDURE — 86787 VARICELLA-ZOSTER ANTIBODY: CPT

## 2022-07-07 PROCEDURE — 80307 DRUG TEST PRSMV CHEM ANLYZR: CPT

## 2022-07-07 PROCEDURE — 87340 HEPATITIS B SURFACE AG IA: CPT

## 2022-07-07 PROCEDURE — 87086 URINE CULTURE/COLONY COUNT: CPT

## 2022-07-07 PROCEDURE — 87491 CHLMYD TRACH DNA AMP PROBE: CPT

## 2022-07-07 PROCEDURE — 86762 RUBELLA ANTIBODY: CPT

## 2022-07-07 PROCEDURE — 87591 N.GONORRHOEAE DNA AMP PROB: CPT

## 2022-07-07 PROCEDURE — 86780 TREPONEMA PALLIDUM: CPT

## 2022-07-07 PROCEDURE — 86803 HEPATITIS C AB TEST: CPT

## 2022-07-07 PROCEDURE — 86901 BLOOD TYPING SEROLOGIC RH(D): CPT

## 2022-07-07 PROCEDURE — 86900 BLOOD TYPING SEROLOGIC ABO: CPT

## 2022-07-07 PROCEDURE — 86850 RBC ANTIBODY SCREEN: CPT

## 2022-07-07 PROCEDURE — 87389 HIV-1 AG W/HIV-1&-2 AB AG IA: CPT

## 2022-07-08 LAB
ABSOLUTE EOS #: 0.17 K/UL (ref 0–0.44)
ABSOLUTE IMMATURE GRANULOCYTE: <0.03 K/UL (ref 0–0.3)
ABSOLUTE LYMPH #: 2.02 K/UL (ref 1.1–3.7)
ABSOLUTE MONO #: 0.72 K/UL (ref 0.1–1.2)
BASOPHILS # BLD: 1 % (ref 0–2)
BASOPHILS ABSOLUTE: 0.07 K/UL (ref 0–0.2)
C. TRACHOMATIS DNA ,URINE: NEGATIVE
CULTURE: NO GROWTH
EOSINOPHILS RELATIVE PERCENT: 2 % (ref 1–4)
HCT VFR BLD CALC: 38.4 % (ref 36.3–47.1)
HEMOGLOBIN: 13.1 G/DL (ref 11.9–15.1)
HEPATITIS B SURFACE ANTIGEN: NONREACTIVE
HIV AG/AB: NONREACTIVE
HPV SAMPLE: NORMAL
HPV, GENOTYPE 16: NOT DETECTED
HPV, GENOTYPE 18: NOT DETECTED
HPV, HIGH RISK OTHER: NOT DETECTED
HPV, INTERPRETATION: NORMAL
IMMATURE GRANULOCYTES: 0 %
LYMPHOCYTES # BLD: 25 % (ref 24–43)
MCH RBC QN AUTO: 30.7 PG (ref 25.2–33.5)
MCHC RBC AUTO-ENTMCNC: 34.1 G/DL (ref 28.4–34.8)
MCV RBC AUTO: 89.9 FL (ref 82.6–102.9)
MONOCYTES # BLD: 9 % (ref 3–12)
N. GONORRHOEAE DNA, URINE: NEGATIVE
NRBC AUTOMATED: 0 PER 100 WBC
PDW BLD-RTO: 12.8 % (ref 11.8–14.4)
PLATELET # BLD: 205 K/UL (ref 138–453)
PMV BLD AUTO: 12 FL (ref 8.1–13.5)
RBC # BLD: 4.27 M/UL (ref 3.95–5.11)
RUBV IGG SER QL: 63.2 IU/ML
SEG NEUTROPHILS: 63 % (ref 36–65)
SEGMENTED NEUTROPHILS ABSOLUTE COUNT: 5.22 K/UL (ref 1.5–8.1)
SPECIMEN DESCRIPTION: NORMAL
T. PALLIDUM, IGG: NONREACTIVE
VZV IGG SER QL IA: 2.92
WBC # BLD: 8.2 K/UL (ref 3.5–11.3)

## 2022-07-12 LAB — CYTOLOGY REPORT: NORMAL

## 2022-08-03 NOTE — PROGRESS NOTES
SUBJECTIVE:    Bronson Walton is here for her return OB visit. denies concerns today. Some constipation   She denies  feeling fetal movement. She denies vaginal bleeding. She denies vaginal discharge. She denies leaking of fluid. She denies uterine cramping. She denies  nausea and/or vomiting. OBJECTIVE:  Blood pressure 110/62, weight 176 lb 8 oz (80.1 kg), last menstrual period 04/28/2022, unknown if currently breastfeeding. Total weight gain: -4 lb 8 oz (-2.041 kg)    Bronson Walton accepted the COVID vaccine as appropriate    ASSESSMENT/PLAN:  IUP @ 14 weeks  S =D    Normal Repeat Pregnancy  Due date is based on LMP and confirmed with 9w6d early dating ultrasound  Patient's prenatal labs are completed. Patient's blood type A positive and rhogam is not indicated in pregnancy. Early glucola indicated: no  Patient declined genetic screening. Anatomy scan scheduled for 9/19/22  Glucola between 24-28 weeks.  Reviewed  Total weight gain in pregnancy reviewed: Yes  2. 13 weeks gestation of pregnancy  - Reviewed fetal movement  - Reviewed warning signs  - Reviewed constipation and management       She was counseled regarding all of the above:    Return in about 4 weeks (around 9/2/2022) for Return OB, with .    The patient, Eliza Moody,  was seen with a total time spent of 25 minutes for the visit on this date of service by the [facility]  On this date August 5, 2022,  I have spent greater than 50% of this visit:  [x] reviewing previous notes  [x] reviewing test results  [x] pre-charting  Discussed with patient:  [x] Importance of compliance with treatment plan  [x] Counseling  [] Coordinating care  [x] Documenting     Electronically Signed By: Radha Scott

## 2022-08-05 ENCOUNTER — ROUTINE PRENATAL (OUTPATIENT)
Dept: OBGYN CLINIC | Age: 32
End: 2022-08-05

## 2022-08-05 VITALS — BODY MASS INDEX: 24.62 KG/M2 | WEIGHT: 176.5 LBS | SYSTOLIC BLOOD PRESSURE: 110 MMHG | DIASTOLIC BLOOD PRESSURE: 62 MMHG

## 2022-08-05 DIAGNOSIS — Z34.90 NORMAL REPEAT PREGNANCY, ANTEPARTUM: Primary | ICD-10-CM

## 2022-08-05 DIAGNOSIS — Z3A.13 13 WEEKS GESTATION OF PREGNANCY: ICD-10-CM

## 2022-08-05 PROCEDURE — 0502F SUBSEQUENT PRENATAL CARE: CPT | Performed by: ADVANCED PRACTICE MIDWIFE

## 2022-09-02 ENCOUNTER — ROUTINE PRENATAL (OUTPATIENT)
Dept: OBGYN CLINIC | Age: 32
End: 2022-09-02

## 2022-09-02 VITALS
SYSTOLIC BLOOD PRESSURE: 120 MMHG | HEART RATE: 88 BPM | DIASTOLIC BLOOD PRESSURE: 74 MMHG | WEIGHT: 180 LBS | BODY MASS INDEX: 25.1 KG/M2

## 2022-09-02 DIAGNOSIS — Z3A.18 18 WEEKS GESTATION OF PREGNANCY: Primary | ICD-10-CM

## 2022-09-02 PROCEDURE — 0502F SUBSEQUENT PRENATAL CARE: CPT | Performed by: OBSTETRICS & GYNECOLOGY

## 2022-09-03 NOTE — PROGRESS NOTES
Aimee Whatley is a 29 y.o. female 18w2d        OB History    Para Term  AB Living   2 1 1 0 0 1   SAB IAB Ectopic Molar Multiple Live Births   0 0 0 0 0 1      # Outcome Date GA Lbr Galindo/2nd Weight Sex Delivery Anes PTL Lv   2 Current            1 Term 20 39w6d 08:28 / 01:04 8 lb 0.2 oz (3.635 kg) F Vag-Spont EPI N DAGOBERTO       Vitals  BP: 120/74  Weight: 180 lb (81.6 kg)  Heart Rate: 88  Patient Position: Sitting  Albumin: Negative  Glucose: Negative    The patient was seen and evaluated. No contractions or leakage of fluid. Signs and symptoms of  labor as well as labor were reviewed. The Nuchal Translucency testing was reviewed and found to be counseled and declined. A single marker MSAFP was reviewed and found to be counseled and declined. The patients anatomy ultrasound has been completed and reviewed with patient. The S/S of Pre-Eclampsia were reviewed with the patient in detail. She is to report any of these if they occur. She currently denies any of these. The patient is RH positive Rhogam Ordered no    The patient was instructed on fetal kick counts and was given a kick sheet to complete every 8 hours. This is to begin at 28 weeks gestation. She was instructed that the baby should move at a minimum of ten times within one hour after a meal. The patient was instructed to lay down on her left side twenty minutes after eating and count movements for up to one hour with a target value of ten movements. She was instructed to notify the office if she did not make that target after two attempts or if after any attempt there was less than four movements.           Plans to deliver: [facility]    FOB Margarita [facility]    1st trimester screen/NIPs:    AFP    Fetal Echo    High Risk:    Early 1 hr GTT: No    Covid Vaccine: Yes Pfizer and booster     Flu Vaccine: Yes    Tdap:    Rhogam: NO A+    1hr GTT:    3hr GTT:    GBS:    ** PLEASE CALL DR RANDHAWA FOR AVAILABILITY WHEN PRESENTS FOR DELIVERY **      Assessment:  1. Luci Mckeon is a 29 y.o. female  2.   3. 18w2d    Patient Active Problem List    Diagnosis Date Noted    History of UTI 2022     Priority: Medium     In pregnancy      Normal repeat pregnancy, antepartum 2022     Priority: Medium     20 F Apg 8/9 Wt 8#0 2020        Diagnosis Orders   1. 18 weeks gestation of pregnancy              Plan:  The patient will return to the office for her next visit in 4 weeks. See antepartum flow sheet. [facility] anatomy ultrasound scheduled. Patient was seen with total face to face time of 20 minutes. More than 50% of this visit was on counseling and education regarding her    Diagnosis Orders   1. 18 weeks gestation of pregnancy         and her options. She was also counseled on her preventative health maintenance recommendations and follow-up.

## 2022-09-19 ENCOUNTER — ROUTINE PRENATAL (OUTPATIENT)
Dept: PERINATAL CARE | Age: 32
End: 2022-09-19
Payer: COMMERCIAL

## 2022-09-19 VITALS
DIASTOLIC BLOOD PRESSURE: 74 MMHG | SYSTOLIC BLOOD PRESSURE: 113 MMHG | BODY MASS INDEX: 25.77 KG/M2 | WEIGHT: 184.08 LBS | RESPIRATION RATE: 16 BRPM | TEMPERATURE: 98.2 F | HEIGHT: 71 IN | HEART RATE: 81 BPM

## 2022-09-19 DIAGNOSIS — Z13.89 ENCOUNTER FOR ROUTINE SCREENING FOR MALFORMATION USING ULTRASONICS: Primary | ICD-10-CM

## 2022-09-19 DIAGNOSIS — Z3A.20 20 WEEKS GESTATION OF PREGNANCY: ICD-10-CM

## 2022-09-19 DIAGNOSIS — Z36.86 ENCOUNTER FOR SCREENING FOR RISK OF PRE-TERM LABOR: ICD-10-CM

## 2022-09-19 LAB
ABDOMINAL CIRCUMFERENCE: NORMAL
ABDOMINAL CIRCUMFERENCE: NORMAL
BIPARIETAL DIAMETER: NORMAL
BIPARIETAL DIAMETER: NORMAL
ESTIMATED FETAL WEIGHT: NORMAL
ESTIMATED FETAL WEIGHT: NORMAL
FEMORAL DIAMETER: NORMAL
FEMORAL DIAMETER: NORMAL
HC/AC: NORMAL
HC/AC: NORMAL
HEAD CIRCUMFERENCE: NORMAL
HEAD CIRCUMFERENCE: NORMAL

## 2022-09-19 PROCEDURE — 76817 TRANSVAGINAL US OBSTETRIC: CPT | Performed by: OBSTETRICS & GYNECOLOGY

## 2022-09-19 PROCEDURE — 76805 OB US >/= 14 WKS SNGL FETUS: CPT | Performed by: OBSTETRICS & GYNECOLOGY

## 2022-09-19 PROCEDURE — 99999 PR OFFICE/OUTPT VISIT,PROCEDURE ONLY: CPT | Performed by: OBSTETRICS & GYNECOLOGY

## 2022-09-19 NOTE — PATIENT INSTRUCTIONS
----- Message from Derick Simms MD sent at 9/4/2019 10:05 PM CDT -----  Please call pt - results are normal Patient advised to follow up with referring physician.

## 2022-09-19 NOTE — PROGRESS NOTES
Obstetric/Gynecology Maternal Fetal Medicine Resident Note    Patient has declined maternal genetic carrier screening and noninvasive prenatal testing and AFP       DO SAUL Gross Resident, PGY2  [facility]  9/19/2022, 9:28 AM

## 2022-09-19 NOTE — PROGRESS NOTES
Patient declined non-invasive prenatal testing/NIPT (cell-free DNA screening) that is offered to all pregnant patients irrespective of baseline risk or maternal age (Obstet [de-identified] 26; 80; ACOG Practice Bulletin Number 226, Screening for Fetal Chromosomal Abnormalities). Patient has declined non-invasive prenatal diagnosis testing (with the Angie Complete test from [facility]) today. Patient has declined the Five Gene Carrier Screen (with the Angie test from [address] [place] [address]) today. MSAFP (maternal serum alpha-feto protein level) lab draw also declined by patient.

## 2022-09-19 NOTE — PROGRESS NOTES
Please refer to attached ultrasound report for doctor's evaluation of the clinical information obtained by vital signs, ultrasound, and/or non-stress test along with management recommendation. No urine specimen given.

## 2022-10-18 ENCOUNTER — ROUTINE PRENATAL (OUTPATIENT)
Dept: OBGYN CLINIC | Age: 32
End: 2022-10-18

## 2022-10-18 VITALS — DIASTOLIC BLOOD PRESSURE: 62 MMHG | WEIGHT: 188 LBS | SYSTOLIC BLOOD PRESSURE: 112 MMHG | BODY MASS INDEX: 26.22 KG/M2

## 2022-10-18 DIAGNOSIS — Z3A.24 24 WEEKS GESTATION OF PREGNANCY: Primary | ICD-10-CM

## 2022-10-18 DIAGNOSIS — Z34.90 NORMAL REPEAT PREGNANCY, ANTEPARTUM: ICD-10-CM

## 2022-10-18 PROCEDURE — 0502F SUBSEQUENT PRENATAL CARE: CPT | Performed by: NURSE PRACTITIONER

## 2022-10-18 NOTE — PROGRESS NOTES
Presents for OB visit  Gestation 24w5d  Estimated Date of Delivery: 23        Plans to deliver: [facility]    FOB Jana Melara    1st trimester screen/NIPs:    AFP    Fetal Echo    High Risk:    Early 1 hr GTT: No    Covid Vaccine: Yes Pfizer and booster     Flu Vaccine: Yes    Tdap:    Rhogam: NO A+    1hr GTT:    3hr GTT:    GBS:    ** PLEASE CALL DR RANDHAWA FOR AVAILABILITY WHEN PRESENTS FOR DELIVERY **                     OB History    Para Term  AB Living   2 1 1 0 0 1   SAB IAB Ectopic Molar Multiple Live Births   0 0 0 0 0 1      # Outcome Date GA Lbr Galindo/2nd Weight Sex Delivery Anes PTL Lv   2 Current            1 Term 20 39w6d 08:28 / 01:04 8 lb 0.2 oz (3.635 kg) F Vag-Spont EPI N DAGOBERTO            No Known Allergies  Current Outpatient Medications   Medication Sig Dispense Refill    Prenatal Vit-Fe Fumarate-FA (PRENATAL COMPLETE PO) Take by mouth      Misc. Devices (BREAST PUMP) MISC Double electric 1 each 0     No current facility-administered medications for this visit. No past medical history on file. Past Surgical History:   Procedure Laterality Date    WISDOM TOOTH EXTRACTION       Headaches: no  Swelling: no  Bleeding: no  Discharge: no   Dysuria: no  Nausea: no  Heartburn: no  Epigastric pain: no  Contractions: no  Leakage of fluid: no  + fetal movement       Return 4 WEEKS    Labs as indicated CBC,1 hr GTT, UA between 24-28 weeks  Antibody screen: n/a     PTL signs reviewed, if indicated  Kick Count Instructions given if indicated: The patient was instructed on fetal kick counts and was given a kick sheet to complete every 8 hours. This is to begin at 28 weeks gestation. She was instructed that the baby should move at a minimum of ten times within one hour after a meal. The patient was instructed to lay down on her left side twenty minutes after eating and count movements for up to one hour with a target value of ten movements.    She was instructed to notify the office if she did not make that target after two attempts or if after any attempt there was less than four movements. After hour numbers reviewed , along with labor signs if indicated. Contractions/cramping between 5-7 minutes and persisting even with attempts of increased water and laying on side. Fluid leakage, bleeding  NST information given no  The patient was counseled on Labor & Delivery. Route of delivery and counseling on vaginal, operative vaginal, and  sections were completed with the risks of each to both the patient as well as her baby. The possibility of a blood transfusion was discussed as well. The patient was not opposed to receiving a transfusion if needed. The patient was counseled on types of analgesia during labor and is considering either Regional or IV medication the risks, benefits and alternatives were discussed. The patient was counseled on the mandatory call ahead policy. She has been instructed to call the office at anytime prior to going into the hospital so the on-call physician may direct her to the appropriate facility for care. Exceptions were reviewed including but not limited to: Decreased fetal movement, vaginal Bleeding or hemorrhage, trauma, readily expectant delivery, or any instance where she feels 911 should be utilized. Of the 20 minute duration appointment visit, Jordan Smallwood CNP spent at least 50% of the face-to-face time in counseling, explanation of diagnosis, planning of further management, and answering all questions.

## 2022-10-18 NOTE — PATIENT INSTRUCTIONS
After Hours Number: You can call the office [phone]  or [phone]  Call if you have:  1. Bleeding like a period  2. Cramps or contractions greater than 2 hours  3. If you are leaking fluid  4. If you've a fever greater than 100°  5. If you feel as if baby is not moving  6. If you have continuous vomiting over 3-4 hours   Counting Your Baby's Kicks: Care Instructions  Your Care Instructions    Counting your baby's kicks is one way your doctor can tell that your baby is healthy. Most women--especially in a first pregnancy--feel their baby move for the first time between 16 and 22 weeks. The movement may feel like flutters rather than kicks. Your baby may move more at certain times of the day. When you are active, you may notice less kicking than when you are resting. At your prenatal visits, your doctor will ask whether the baby is active. In your last trimester, your doctor may ask you to count the number of times you feel your baby move. Follow-up care is a key part of your treatment and safety. Be sure to make and go to all appointments, and call your doctor if you are having problems. It's also a good idea to know your test results and keep a list of the medicines you take. How do you count fetal kicks? A common method of checking your baby's movement is to count the number of kicks or moves you feel in 1 hour. Ten movements (such as kicks, flutters, or rolls) in 1 hour are normal. Some doctors suggest that you count in the morning until you get to 10 movements. Then you can quit for that day and start again the next day. Pick your baby's most active time of day to count. This may be any time from morning to evening. If you do not feel 10 movements in an hour, your baby may be sleeping. Wait for the next hour and count again. When should you call for help?   Call your doctor now or seek immediate medical care if:    You noticed that your baby has stopped moving or is moving much less than normal.    Watch closely for changes in your health, and be sure to contact your doctor if you have any problems. Where can you learn more? Go to [email or web address] and sign in to your [employer] account. Enter W299 in the [facility] box to learn more about \"Counting Your Baby's Kicks: Care Instructions. \"     If you do not have an account, please click on the \"Sign Up Now\" link. Current as of: September 5, 2018  Content Version: 12.0  © 2366-1223 [employer]. Care instructions adapted under license by [facility] ([facility]). If you have questions about a medical condition or this instruction, always ask your healthcare professional. [address] any warranty or liability for your use of this information. Preeclampsia: Care Instructions  Overview    Preeclampsia occurs when a woman's blood pressure rises during pregnancy. Often with preeclampsia, you also have swelling in your legs, hands, and face. A test may show too much protein in your urine. Preeclampsia is also called toxemia. If preeclampsia is severe and not treated, it can lead to seizures (eclampsia) and damage to your liver or kidneys. Preeclampsia can prevent your baby from getting enough food and oxygen. This can cause a low birth weight or other problems. Your doctor will watch you closely to prevent these problems. He or she also may recommend that you reduce your activity. If your preeclampsia is a danger to your health or the health of your baby, your doctor may need to deliver your baby early. While preeclampsia is a concern, most women with preeclampsia have healthy babies. After a woman gives birth, preeclampsia usually goes away on its own. But symptoms may last a few weeks or more and can get worse after delivery. Rarely, symptoms of preeclampsia don't show up until days or even weeks after childbirth. Follow-up care is a key part of your treatment and safety.  Be sure to make and go to all appointments, and call your doctor if you are having problems. It's also a good idea to know your test results and keep a list of the medicines you take. How can you care for yourself at home? Take and record your blood pressure at home if your doctor tells you to. Learn the importance of the two measures of blood pressure (such as 120 over 80, or 120/80). The first number is the systolic pressure, which is the force of blood on the artery walls as the heart pumps. The second number is the diastolic pressure, which is the force of blood on the artery walls between heartbeats, when the heart is at rest. You have a choice of monitors to use. Manual monitor: You pump up the cuff and use a stethoscope to listen for your pulse. Electronic monitor: The cuff inflates, and a gauge shows your pulse rate. To take your blood pressure:  Ask your doctor to check your blood pressure monitor to be sure that it is accurate and that the cuff fits you. Also ask your doctor to watch you to make sure that you are using it right. You should not eat, use tobacco products, or use medicine known to raise blood pressure (such as some nasal decongestant sprays) before you take your blood pressure. Avoid taking your blood pressure if you have just exercised. Also avoid taking it if you are nervous or upset. Rest at least 15 minutes before you take your blood pressure. If your doctor advises, check the protein levels in your urine. Your doctor or nurse will show you how to do this. Take your medicines exactly as prescribed. Call your doctor if you think you are having a problem with your medicine. Do not smoke. Quitting smoking will help improve your baby's growth and health. If you need help quitting, talk to your doctor about stop-smoking programs and medicines. These can increase your chances of quitting for good. Eat a balanced and healthy diet that has lots of fruits and vegetables.   If your doctor advised bed rest, be sure to stay off your feet and rest as much as possible. Keep a phone, phone book, notepad, and pen near the bed where you can easily reach them. Gently stretch your legs every hour to maintain good blood flow. Have another family member pack snacks and lunch food in a cooler close to your bed. Use this time for activities that you usually cannot find time for, such as reading, craft projects, or letter writing. You can keep track of your baby's health by noting the length of time it takes to count 10 movements (such as kicks, flutters, or rolls). Feeling 10 movements in less than 1 hour is considered normal. Track your baby's movements once each day. Bring this record with you to each prenatal visit. When should you call for help? Call 911 anytime you think you may need emergency care. For example, call if:    You passed out (lost consciousness). You have a seizure. Call your doctor now or seek immediate medical care if:    You have symptoms of preeclampsia, such as:  Sudden swelling of your face, hands, or feet. New vision problems (such as dimness or blurring). A severe headache. Your blood pressure is higher than it should be, or it rises suddenly. You have new nausea or vomiting. You think that you are in labor. You have pain in your belly or pelvis. Watch closely for changes in your health, and be sure to contact your doctor if:    You gain weight rapidly. Where can you learn more? Go to https://NetSol TechnologiespeRumbleTalk.[employer]. org and sign in to your [employer] account. Enter T424 in the [facility] box to learn more about \"Preeclampsia: Care Instructions. \"     If you do not have an account, please click on the \"Sign Up Now\" link. Current as of: September 5, 2018  Content Version: 12.0  © 9009-7662 [employer]. Care instructions adapted under license by [facility] ([facility]).  If you have questions about a medical condition or this instruction, always ask your healthcare professional. [place] [address] any warranty or liability for your use of this information.  Labor: Care Instructions  Your Care Instructions     labor is the start of labor between 21 and 36 weeks of pregnancy. A full-term pregnancy lasts 37 to 42 weeks. In labor, the uterus contracts to open the cervix. This is the first stage of childbirth.  labor can be caused by a problem with the baby, the mother, or both. Often the cause is not known. In some cases, doctors use medicines to try to delay labor until 29 or more weeks of pregnancy. By this time, a baby has grown enough so that problems are not likely. In some cases--such as with a serious infection--it is healthier for the baby to be born early. Your treatment will depend on how far along you are in your pregnancy and on your health and your baby's health. Follow-up care is a key part of your treatment and safety. Be sure to make and go to all appointments, and call your doctor if you are having problems. It's also a good idea to know your test results and keep a list of the medicines you take. How can you care for yourself at home? If your doctor prescribed medicines, take them exactly as directed. Call your doctor if you think you are having a problem with your medicine. Rest until your doctor advises you about activity. He or she will tell you if you should stay in bed most of the time. You may need to arrange for  if you have young children. Do not have sexual intercourse unless your doctor says it is safe. Use pads, not tampons, if you have vaginal bleeding. Make sure to drink plenty of fluids. Dehydration can lead to contractions. If you have kidney, heart, or liver disease and have to limit fluids, talk with your doctor before you increase the amount of fluids you drink. Do not smoke or allow others to smoke around you.  If you need help quitting, talk to your doctor about stop-smoking programs and medicines. These can increase your chances of quitting for good. When should you call for help? Call 911 anytime you think you may need emergency care. For example, call if:    You passed out (lost consciousness). You have severe vaginal bleeding. You have severe pain in your belly or pelvis. You have had fluid gushing or leaking from your vagina and you know or think the umbilical cord is bulging into your vagina. If this happens, immediately get down on your knees so your rear end (buttocks) is higher than your head. This will decrease the pressure on the cord until help arrives. Call your doctor now or seek immediate medical care if:    You have signs of preeclampsia, such as:  Sudden swelling of your face, hands, or feet. New vision problems (such as dimness or blurring). A severe headache. You have any vaginal bleeding. You have belly pain or cramping. You have a fever. You have had regular contractions (with or without pain) for an hour. This means that you have 6 or more within 1 hour after you change your position and drink fluids. You have a sudden release of fluid from the vagina. You have low back pain or pelvic pressure that does not go away. You notice that your baby has stopped moving or is moving much less than normal.    Watch closely for changes in your health, and be sure to contact your doctor if you have any problems. Where can you learn more? Go to [email or web address] org and sign in to your [facility] account. Enter Q400 in the [facility] box to learn more about \" Labor: Care Instructions. \"     If you do not have an account, please click on the \"Sign Up Now\" link. Current as of: 2018  Content Version: 12.0  © 8692-8681 [employer]wise[employer]. Care instructions adapted under license by [facility] ([facility]).  If you have questions about a medical condition or this instruction, always ask your healthcare professional. [place] [address] any warranty or liability for your use of this information.

## 2022-11-04 ENCOUNTER — HOSPITAL ENCOUNTER (OUTPATIENT)
Age: 32
Discharge: HOME | End: 2022-11-04
Payer: COMMERCIAL

## 2022-11-04 DIAGNOSIS — Z3A.24 24 WEEKS GESTATION OF PREGNANCY: ICD-10-CM

## 2022-11-04 LAB
GLUCOSE ADMINISTRATION: ABNORMAL
GLUCOSE TOLERANCE SCREEN 50G: 139 MG/DL (ref 70–135)
HCT VFR BLD CALC: 35.1 % (ref 36.3–47.1)
HEMOGLOBIN: 11.2 G/DL (ref 11.9–15.1)
MCH RBC QN AUTO: 30.8 PG (ref 25.2–33.5)
MCHC RBC AUTO-ENTMCNC: 31.9 G/DL (ref 28.4–34.8)
MCV RBC AUTO: 96.4 FL (ref 82.6–102.9)
NRBC AUTOMATED: 0 PER 100 WBC
PDW BLD-RTO: 13.5 % (ref 11.8–14.4)
PLATELET # BLD: 230 K/UL (ref 138–453)
PMV BLD AUTO: 11.2 FL (ref 8.1–13.5)
RBC # BLD: 3.64 M/UL (ref 3.95–5.11)
WBC # BLD: 9.2 K/UL (ref 3.5–11.3)

## 2022-11-04 PROCEDURE — 85027 COMPLETE CBC AUTOMATED: CPT

## 2022-11-04 PROCEDURE — 82950 GLUCOSE TEST: CPT

## 2022-11-04 PROCEDURE — 36415 COLL VENOUS BLD VENIPUNCTURE: CPT

## 2022-11-04 PROCEDURE — 87086 URINE CULTURE/COLONY COUNT: CPT

## 2022-11-05 LAB
CULTURE: NORMAL
SPECIMEN DESCRIPTION: NORMAL

## 2022-11-15 ENCOUNTER — ROUTINE PRENATAL (OUTPATIENT)
Dept: OBGYN CLINIC | Age: 32
End: 2022-11-15
Payer: COMMERCIAL

## 2022-11-15 VITALS — DIASTOLIC BLOOD PRESSURE: 60 MMHG | WEIGHT: 192 LBS | BODY MASS INDEX: 26.78 KG/M2 | SYSTOLIC BLOOD PRESSURE: 110 MMHG

## 2022-11-15 DIAGNOSIS — Z34.90 NORMAL REPEAT PREGNANCY, ANTEPARTUM: Primary | ICD-10-CM

## 2022-11-15 DIAGNOSIS — Z23 NEED FOR DIPHTHERIA-TETANUS-PERTUSSIS (TDAP) VACCINE: ICD-10-CM

## 2022-11-15 DIAGNOSIS — Z3A.28 28 WEEKS GESTATION OF PREGNANCY: ICD-10-CM

## 2022-11-15 DIAGNOSIS — R73.09 ABNORMAL GLUCOSE TOLERANCE TEST: ICD-10-CM

## 2022-11-15 PROCEDURE — 90471 IMMUNIZATION ADMIN: CPT | Performed by: ADVANCED PRACTICE MIDWIFE

## 2022-11-15 PROCEDURE — 90715 TDAP VACCINE 7 YRS/> IM: CPT | Performed by: ADVANCED PRACTICE MIDWIFE

## 2022-11-15 PROCEDURE — 0502F SUBSEQUENT PRENATAL CARE: CPT | Performed by: ADVANCED PRACTICE MIDWIFE

## 2022-11-15 SDOH — ECONOMIC STABILITY: FOOD INSECURITY: WITHIN THE PAST 12 MONTHS, YOU WORRIED THAT YOUR FOOD WOULD RUN OUT BEFORE YOU GOT MONEY TO BUY MORE.: NEVER TRUE

## 2022-11-15 SDOH — ECONOMIC STABILITY: FOOD INSECURITY: WITHIN THE PAST 12 MONTHS, THE FOOD YOU BOUGHT JUST DIDN'T LAST AND YOU DIDN'T HAVE MONEY TO GET MORE.: NEVER TRUE

## 2022-11-15 ASSESSMENT — PATIENT HEALTH QUESTIONNAIRE - PHQ9
SUM OF ALL RESPONSES TO PHQ QUESTIONS 1-9: 0
1. LITTLE INTEREST OR PLEASURE IN DOING THINGS: 0
SUM OF ALL RESPONSES TO PHQ9 QUESTIONS 1 & 2: 0
SUM OF ALL RESPONSES TO PHQ QUESTIONS 1-9: 0
2. FEELING DOWN, DEPRESSED OR HOPELESS: 0
SUM OF ALL RESPONSES TO PHQ QUESTIONS 1-9: 0
SUM OF ALL RESPONSES TO PHQ QUESTIONS 1-9: 0

## 2022-11-15 ASSESSMENT — SOCIAL DETERMINANTS OF HEALTH (SDOH): HOW HARD IS IT FOR YOU TO PAY FOR THE VERY BASICS LIKE FOOD, HOUSING, MEDICAL CARE, AND HEATING?: NOT HARD AT ALL

## 2022-11-15 NOTE — PROGRESS NOTES
The patient requested to have the TDAP vaccine. Consent obtained. Injection of 0.5mL given Right deltoid Intramuscular. Lot #:[id number]  EXP:11/04/2023  Patient tolerated well.

## 2022-11-15 NOTE — PROGRESS NOTES
SUBJECTIVE:    Slick Fernandez is here for her return OB visit. denies concerns today. Reports still has on going cold symptoms so has not done 3 hour but is planning to do so after she feels well again. She reports  feeling fetal movement. She denies vaginal bleeding. She denies vaginal discharge. She denies leaking of fluid. She denies uterine cramping. She denies  nausea and/or vomiting. OBJECTIVE:  Blood pressure 110/60, weight 192 lb (87.1 kg), last menstrual period 2022, unknown if currently breastfeeding. Total weight gain: 11 lb (4.99 kg)    Slick Fernandez accepted the flu vaccine as appropriate. Slick Fernandez accepted the Tdap vaccine as appropriate  Slick Fernandez accepted the COVID vaccine as appropriate    ASSESSMENT/PLAN:  IUP @ 28+5 weeks  S =D    Normal Repeat Pregnancy  Due date is based on LMP and confirmed with 9w6d early dating ultrasound  Patient's prenatal labs are completed. Patient's blood type A positive and rhogam is not indicated in pregnancy. Early glucola indicated: no  Patient declined genetic screening. Anatomy scan completed. Placenta posterior,normal cord insertion: Yes, cervical length 4.08cm, no low lying, no previa noted. Follow up as clinically indicated. Glucola between 24-28 weeks. complete 139 Fail  Total weight gain in pregnancy reviewed: Yes  Fetal movement reviewed  Reviewed signs and symptoms of  labor: yes  Reviewed signs and symptoms of preeclampsia: yes  Reviewed signs and symptoms of emerson hick contractions: yes      2. 28 weeks gestation of pregnancy  - Reviewed warning signs    3. Abnormal glucose tolerance test  - needs 3 hour, reviewed and discussed with patient    4.  Need for diphtheria-tetanus-pertussis (Tdap) vaccine  - Vaccine information statement tdap edition 2020 given to patient on 11/15/2022  - Tdap, BOOSTRIX, (age 8 yrs+), IM  - LA INJECTION,THERAP/PROPH/DIAGNOST, IM OR SUBCUT        She was counseled regarding all of the above:    Return in about 2 weeks (around 11/29/2022) for Return OB.     The patient, Ricky Lozano,  was seen with a total time spent of 25 minutes for the visit on this date of service by the [facility]  On this date November 15, 2022,  I have spent greater than 50% of this visit:  [x] reviewing previous notes  [x] reviewing test results  [x] pre-charting  Discussed with patient:  [x] Importance of compliance with treatment plan  [x] Counseling  [] Coordinating care  [x] Documenting     Electronically Signed By: Radha Bacon

## 2022-11-23 ENCOUNTER — HOSPITAL ENCOUNTER (OUTPATIENT)
Age: 32
Discharge: HOME | End: 2022-11-23
Payer: COMMERCIAL

## 2022-11-23 DIAGNOSIS — R73.09 ABNORMAL GLUCOSE TOLERANCE TEST: ICD-10-CM

## 2022-11-23 LAB
3 HR GLUCOSE: 100 MG/DL (ref 65–139)
AMOUNT GLUCOSE GIVEN: ABNORMAL G
GLUCOSE FASTING: 89 MG/DL (ref 65–94)
GLUCOSE TOLERANCE TEST 1 HOUR: 183 MG/DL (ref 65–179)
GLUCOSE TOLERANCE TEST 2 HOUR: 138 MG/DL (ref 65–154)

## 2022-11-23 PROCEDURE — 82952 GTT-ADDED SAMPLES: CPT

## 2022-11-23 PROCEDURE — 82951 GLUCOSE TOLERANCE TEST (GTT): CPT

## 2022-11-23 PROCEDURE — 36415 COLL VENOUS BLD VENIPUNCTURE: CPT

## 2022-12-13 ENCOUNTER — ROUTINE PRENATAL (OUTPATIENT)
Dept: OBGYN CLINIC | Age: 32
End: 2022-12-13

## 2022-12-13 VITALS — BODY MASS INDEX: 27.2 KG/M2 | SYSTOLIC BLOOD PRESSURE: 126 MMHG | WEIGHT: 195 LBS | DIASTOLIC BLOOD PRESSURE: 74 MMHG

## 2022-12-13 DIAGNOSIS — Z34.83 ENCOUNTER FOR SUPERVISION OF OTHER NORMAL PREGNANCY IN THIRD TRIMESTER: Primary | ICD-10-CM

## 2022-12-13 DIAGNOSIS — Z3A.32 32 WEEKS GESTATION OF PREGNANCY: ICD-10-CM

## 2022-12-13 PROCEDURE — 0502F SUBSEQUENT PRENATAL CARE: CPT | Performed by: CLINICAL NURSE SPECIALIST

## 2022-12-13 NOTE — PROGRESS NOTES
Vladimir Mondragon is a 29 y.o. female 32w5d         OB History    Para Term  AB Living   2 1 1 0 0 1   SAB IAB Ectopic Molar Multiple Live Births   0 0 0 0 0 1      # Outcome Date GA Lbr Galindo/2nd Weight Sex Delivery Anes PTL Lv   2 Current            1 Term 20 39w6d 08:28 / 01:04 8 lb 0.2 oz (3.635 kg) F Vag-Spont EPI N DAGOBERTO       Blood pressure 126/74, weight 195 lb (88.5 kg), last menstrual period 2022, unknown if currently breastfeeding. The patient was seen and evaluated. There was positive fetal movements. No contractions or leakage of fluid. Signs and symptoms of  labor as well as labor were reviewed. The S/S of Pre-Eclampsia were reviewed with the patient in detail. She is to report any of these if they occur. She currently denies any of these. The patient had her 28 week labs completed. The patient was instructed on fetal kick counts and was given a kick sheet to complete every 8 hours. She was instructed that the baby should move at a minimum of ten times within one hour after a meal. The patient was instructed to lay down on her left side twenty minutes after eating and count movements for up to one hour with a target value of ten movements. She was instructed to notify the office if she did not make that target after two attempts or if after any attempt there was less than four movements. The patient reports that the targets have been made Yes. Patient Active Problem List    Diagnosis Date Noted    History of UTI 2022     Priority: Medium     In pregnancy      Normal repeat pregnancy, antepartum 2022     Priority: Medium     20 F Apg 8/9 Wt 8#0 2020        Diagnosis Orders   1. Encounter for supervision of other normal pregnancy in third trimester        2. 32 weeks gestation of pregnancy        Continue prenatal vitamins, increase water intake and frequent rest periods as needed. Fetal kick counts reviewed.       The patient will return to the office for her next visit in 2 weeks. See antepartum flow sheet.      Electronically signed by: Harvey Colon CNP

## 2022-12-29 ENCOUNTER — ROUTINE PRENATAL (OUTPATIENT)
Dept: OBGYN CLINIC | Age: 32
End: 2022-12-29

## 2022-12-29 VITALS — BODY MASS INDEX: 27.62 KG/M2 | SYSTOLIC BLOOD PRESSURE: 118 MMHG | WEIGHT: 198 LBS | DIASTOLIC BLOOD PRESSURE: 62 MMHG

## 2022-12-29 DIAGNOSIS — Z34.83 ENCOUNTER FOR SUPERVISION OF OTHER NORMAL PREGNANCY IN THIRD TRIMESTER: Primary | ICD-10-CM

## 2022-12-29 DIAGNOSIS — Z3A.35 35 WEEKS GESTATION OF PREGNANCY: ICD-10-CM

## 2022-12-29 PROCEDURE — 0502F SUBSEQUENT PRENATAL CARE: CPT | Performed by: NURSE PRACTITIONER

## 2022-12-29 NOTE — PROGRESS NOTES
Presents for OB visit  Gestation 35w0d  Estimated Date of Delivery: 23      GENDER SURPRISE  Plans to deliver: [facility]    FOB [place]    1st trimester screen/NIPs:    AFP    Fetal Echo    High Risk:    Early 1 hr GTT: No    Covid Vaccine: Yes Pfizer and booster     Flu Vaccine: Yes    Tdap: yes 11/15    Rhogam: NO A+    1hr GTT:  139, passed  3hour GTT 22    3hr GTT:    GBS:    ** PLEASE CALL DR RANDHAWA FOR AVAILABILITY WHEN PRESENTS FOR DELIVERY **          OB History    Para Term  AB Living   2 1 1 0 0 1   SAB IAB Ectopic Molar Multiple Live Births   0 0 0 0 0 1      # Outcome Date GA Lbr Galindo/2nd Weight Sex Delivery Anes PTL Lv   2 Current            1 Term 20 39w6d 08:28 / 01:04 8 lb 0.2 oz (3.635 kg) F Vag-Spont EPI N DAGOBERTO            No Known Allergies  Current Outpatient Medications   Medication Sig Dispense Refill    Prenatal Vit-Fe Fumarate-FA (PRENATAL COMPLETE PO) Take by mouth      Misc. Devices (BREAST PUMP) MISC Double electric 1 each 0     No current facility-administered medications for this visit. No past medical history on file. Past Surgical History:   Procedure Laterality Date    WISDOM TOOTH EXTRACTION       Headaches: no  Swelling: no  Bleeding: no  Discharge: no   Dysuria: no  Nausea: no  Heartburn: no  Epigastric pain: no  Contractions: no  Leakage of fluid: no  + fetal movement       Return 1 WEEK    Labs as indicated n/a    PTL signs reviewed, if indicated  Kick Count Instructions given if indicated: The patient was instructed on fetal kick counts and was given a kick sheet to complete every 8 hours. This is to begin at 28 weeks gestation. She was instructed that the baby should move at a minimum of ten times within one hour after a meal. The patient was instructed to lay down on her left side twenty minutes after eating and count movements for up to one hour with a target value of ten movements.    She was instructed to notify the office if she did not make that target after two attempts or if after any attempt there was less than four movements. After hour numbers reviewed , along with labor signs if indicated. Contractions/cramping between 5-7 minutes and persisting even with attempts of increased water and laying on side. Fluid leakage, bleeding  NST information given no  The patient was counseled on Labor & Delivery. Route of delivery and counseling on vaginal, operative vaginal, and  sections were completed with the risks of each to both the patient as well as her baby. The possibility of a blood transfusion was discussed as well. The patient was not opposed to receiving a transfusion if needed. The patient was counseled on types of analgesia during labor and is considering either Regional or IV medication the risks, benefits and alternatives were discussed. The patient was counseled on the mandatory call ahead policy. She has been instructed to call the office at anytime prior to going into the hospital so the on-call physician may direct her to the appropriate facility for care. Exceptions were reviewed including but not limited to: Decreased fetal movement, vaginal Bleeding or hemorrhage, trauma, readily expectant delivery, or any instance where she feels 911 should be utilized. Of the 20 minute duration appointment visit, Lucia Osei CNP spent at least 50% of the face-to-face time in counseling, explanation of diagnosis, planning of further management, and answering all questions.

## 2022-12-29 NOTE — PATIENT INSTRUCTIONS
After Hours Number: You can call the office [phone]  or [phone]  Call if you have:  1. Bleeding like a period  2. Cramps or contractions greater than 2 hours  3. If you are leaking fluid  4. If you've a fever greater than 100°  5. If you feel as if baby is not moving  6. If you have continuous vomiting over 3-4 hours   Counting Your Baby's Kicks: Care Instructions  Your Care Instructions    Counting your baby's kicks is one way your doctor can tell that your baby is healthy. Most women--especially in a first pregnancy--feel their baby move for the first time between 16 and 22 weeks. The movement may feel like flutters rather than kicks. Your baby may move more at certain times of the day. When you are active, you may notice less kicking than when you are resting. At your prenatal visits, your doctor will ask whether the baby is active. In your last trimester, your doctor may ask you to count the number of times you feel your baby move. Follow-up care is a key part of your treatment and safety. Be sure to make and go to all appointments, and call your doctor if you are having problems. It's also a good idea to know your test results and keep a list of the medicines you take. How do you count fetal kicks? A common method of checking your baby's movement is to count the number of kicks or moves you feel in 1 hour. Ten movements (such as kicks, flutters, or rolls) in 1 hour are normal. Some doctors suggest that you count in the morning until you get to 10 movements. Then you can quit for that day and start again the next day. Pick your baby's most active time of day to count. This may be any time from morning to evening. If you do not feel 10 movements in an hour, your baby may be sleeping. Wait for the next hour and count again. When should you call for help?   Call your doctor now or seek immediate medical care if:    You noticed that your baby has stopped moving or is moving much less than normal.    Watch closely for changes in your health, and be sure to contact your doctor if you have any problems. Where can you learn more? Go to https://chpepiceweb.CommuniClique. org and sign in to your [employer] account. Enter K635 in the [employer] box to learn more about \"Counting Your Baby's Kicks: Care Instructions. \"     If you do not have an account, please click on the \"Sign Up Now\" link. Current as of: September 5, 2018  Content Version: 12.0  © 9552-9923 [employer]. Care instructions adapted under license by [facility] ([facility]). If you have questions about a medical condition or this instruction, always ask your healthcare professional. [address] any warranty or liability for your use of this information. Preeclampsia: Care Instructions  Overview    Preeclampsia occurs when a woman's blood pressure rises during pregnancy. Often with preeclampsia, you also have swelling in your legs, hands, and face. A test may show too much protein in your urine. Preeclampsia is also called toxemia. If preeclampsia is severe and not treated, it can lead to seizures (eclampsia) and damage to your liver or kidneys. Preeclampsia can prevent your baby from getting enough food and oxygen. This can cause a low birth weight or other problems. Your doctor will watch you closely to prevent these problems. He or she also may recommend that you reduce your activity. If your preeclampsia is a danger to your health or the health of your baby, your doctor may need to deliver your baby early. While preeclampsia is a concern, most women with preeclampsia have healthy babies. After a woman gives birth, preeclampsia usually goes away on its own. But symptoms may last a few weeks or more and can get worse after delivery. Rarely, symptoms of preeclampsia don't show up until days or even weeks after childbirth. Follow-up care is a key part of your treatment and safety.  Be sure to make rest, be sure to stay off your feet and rest as much as possible. Keep a phone, phone book, notepad, and pen near the bed where you can easily reach them. Gently stretch your legs every hour to maintain good blood flow. Have another family member pack snacks and lunch food in a cooler close to your bed. Use this time for activities that you usually cannot find time for, such as reading, craft projects, or letter writing. You can keep track of your baby's health by noting the length of time it takes to count 10 movements (such as kicks, flutters, or rolls). Feeling 10 movements in less than 1 hour is considered normal. Track your baby's movements once each day. Bring this record with you to each prenatal visit. When should you call for help? Call 911 anytime you think you may need emergency care. For example, call if:    You passed out (lost consciousness). You have a seizure. Call your doctor now or seek immediate medical care if:    You have symptoms of preeclampsia, such as:  Sudden swelling of your face, hands, or feet. New vision problems (such as dimness or blurring). A severe headache. Your blood pressure is higher than it should be, or it rises suddenly. You have new nausea or vomiting. You think that you are in labor. You have pain in your belly or pelvis. Watch closely for changes in your health, and be sure to contact your doctor if:    You gain weight rapidly. Where can you learn more? Go to https://Allied Digital ServicespeLOOKSIMA.Concorde Solutions. org and sign in to your [employer] account. Enter V604 in the [facility] box to learn more about \"Preeclampsia: Care Instructions. \"     If you do not have an account, please click on the \"Sign Up Now\" link. Current as of: September 5, 2018  Content Version: 12.0  © 7213-7278 Healthwise, Incorporated. Care instructions adapted under license by [facility] [facility] ([facility]).  If you have questions about a medical condition or this instruction, always ask your healthcare professional. [place] [address] any warranty or liability for your use of this information.  Labor: Care Instructions  Your Care Instructions     labor is the start of labor between 21 and 36 weeks of pregnancy. A full-term pregnancy lasts 37 to 42 weeks. In labor, the uterus contracts to open the cervix. This is the first stage of childbirth.  labor can be caused by a problem with the baby, the mother, or both. Often the cause is not known. In some cases, doctors use medicines to try to delay labor until 29 or more weeks of pregnancy. By this time, a baby has grown enough so that problems are not likely. In some cases--such as with a serious infection--it is healthier for the baby to be born early. Your treatment will depend on how far along you are in your pregnancy and on your health and your baby's health. Follow-up care is a key part of your treatment and safety. Be sure to make and go to all appointments, and call your doctor if you are having problems. It's also a good idea to know your test results and keep a list of the medicines you take. How can you care for yourself at home? If your doctor prescribed medicines, take them exactly as directed. Call your doctor if you think you are having a problem with your medicine. Rest until your doctor advises you about activity. He or she will tell you if you should stay in bed most of the time. You may need to arrange for  if you have young children. Do not have sexual intercourse unless your doctor says it is safe. Use pads, not tampons, if you have vaginal bleeding. Make sure to drink plenty of fluids. Dehydration can lead to contractions. If you have kidney, heart, or liver disease and have to limit fluids, talk with your doctor before you increase the amount of fluids you drink. Do not smoke or allow others to smoke around you.  If you need help quitting, talk to your doctor about stop-smoking programs and medicines. These can increase your chances of quitting for good. When should you call for help? Call 911 anytime you think you may need emergency care. For example, call if:    You passed out (lost consciousness). You have severe vaginal bleeding. You have severe pain in your belly or pelvis. You have had fluid gushing or leaking from your vagina and you know or think the umbilical cord is bulging into your vagina. If this happens, immediately get down on your knees so your rear end (buttocks) is higher than your head. This will decrease the pressure on the cord until help arrives. Call your doctor now or seek immediate medical care if:    You have signs of preeclampsia, such as:  Sudden swelling of your face, hands, or feet. New vision problems (such as dimness or blurring). A severe headache. You have any vaginal bleeding. You have belly pain or cramping. You have a fever. You have had regular contractions (with or without pain) for an hour. This means that you have 6 or more within 1 hour after you change your position and drink fluids. You have a sudden release of fluid from the vagina. You have low back pain or pelvic pressure that does not go away. You notice that your baby has stopped moving or is moving much less than normal.    Watch closely for changes in your health, and be sure to contact your doctor if you have any problems. Where can you learn more? Go to https://Bucky Boxtajeb.AnTuTu. org and sign in to your Pl[employer]pi account. Enter Q400 in the [facility] box to learn more about \" Labor: Care Instructions. \"     If you do not have an account, please click on the \"Sign Up Now\" link. Current as of: 2018  Content Version: 12.0  © 9978-6398 Healthwise, [employer]. Care instructions adapted under license by [facility] ([facility]).  If you have questions about a medical condition or this instruction, always ask your healthcare professional. [place] [address] any warranty or liability for your use of this information.

## 2023-01-09 ENCOUNTER — ROUTINE PRENATAL (OUTPATIENT)
Dept: OBGYN CLINIC | Age: 33
End: 2023-01-09

## 2023-01-09 ENCOUNTER — HOSPITAL ENCOUNTER (OUTPATIENT)
Age: 33
Setting detail: SPECIMEN
Discharge: HOME | End: 2023-01-09

## 2023-01-09 VITALS — BODY MASS INDEX: 27.62 KG/M2 | SYSTOLIC BLOOD PRESSURE: 110 MMHG | WEIGHT: 198 LBS | DIASTOLIC BLOOD PRESSURE: 72 MMHG

## 2023-01-09 DIAGNOSIS — Z34.83 ENCOUNTER FOR SUPERVISION OF OTHER NORMAL PREGNANCY IN THIRD TRIMESTER: Primary | ICD-10-CM

## 2023-01-09 DIAGNOSIS — O36.63X1 LGA (LARGE FOR GESTATIONAL AGE) FETUS AFFECTING MANAGEMENT OF MOTHER, THIRD TRIMESTER, FETUS 1: ICD-10-CM

## 2023-01-09 DIAGNOSIS — Z3A.36 36 WEEKS GESTATION OF PREGNANCY: ICD-10-CM

## 2023-01-09 PROCEDURE — 0502F SUBSEQUENT PRENATAL CARE: CPT | Performed by: OBSTETRICS & GYNECOLOGY

## 2023-01-10 ENCOUNTER — HOSPITAL ENCOUNTER (OUTPATIENT)
Age: 33
Discharge: HOME | End: 2023-01-10
Payer: COMMERCIAL

## 2023-01-10 DIAGNOSIS — Z34.83 ENCOUNTER FOR SUPERVISION OF OTHER NORMAL PREGNANCY IN THIRD TRIMESTER: ICD-10-CM

## 2023-01-10 DIAGNOSIS — O36.63X1 LGA (LARGE FOR GESTATIONAL AGE) FETUS AFFECTING MANAGEMENT OF MOTHER, THIRD TRIMESTER, FETUS 1: ICD-10-CM

## 2023-01-10 DIAGNOSIS — Z3A.36 36 WEEKS GESTATION OF PREGNANCY: ICD-10-CM

## 2023-01-10 LAB
ESTIMATED AVERAGE GLUCOSE: 111 MG/DL
HBA1C MFR BLD: 5.5 % (ref 4–6)

## 2023-01-10 PROCEDURE — 83036 HEMOGLOBIN GLYCOSYLATED A1C: CPT

## 2023-01-10 PROCEDURE — 36415 COLL VENOUS BLD VENIPUNCTURE: CPT

## 2023-01-10 NOTE — PROGRESS NOTES
Luci Mckeon is a 29 y.o. female 36w4d        OB History    Para Term  AB Living   2 1 1 0 0 1   SAB IAB Ectopic Molar Multiple Live Births   0 0 0 0 0 1      # Outcome Date GA Lbr Galindo/2nd Weight Sex Delivery Anes PTL Lv   2 Current            1 Term 20 39w6d 08:28 / 01:04 8 lb 0.2 oz (3.635 kg) F Vag-Spont EPI N DAGOBERTO         Vitals  BP: 110/72  Weight: 198 lb (89.8 kg)  Patient Position: Sitting  Albumin: Negative  Glucose: Negative      The patient was seen and evaluated. There was positive fetal movements. No contractions or leakage of fluid. Signs and symptoms of labor were reviewed. The S/S of Pre-Eclampsia were reviewed with the patient in detail. She is to report any of these if they occur. She currently denies any of these. The patient was instructed on fetal kick counts and was given a kick sheet to complete every 8 hours. She was instructed that the baby should move at a minimum of ten times within one hour after a meal. The patient was instructed to lay down on her left side twenty minutes after eating and count movements for up to one hour with a target value of ten movements. She was instructed to notify the office if she did not make that target after two attempts or if after any attempt there was less than four movements. The patient reports that the targets have been made Yes.   GENDER SURPRISE  Plans to deliver: [facility]    FOB Danica Mosesviri    1st trimester screen/NIPs:    AFP    Fetal Echo    High Risk:    Early 1 hr GTT: No    Covid Vaccine: Yes Pfizer and booster     Flu Vaccine: Yes    Tdap: yes 11/15    Rhogam: NO A+    1hr GTT:  139 3hour ordered     3hr GTT:    GBS:    ** 23 ultrasound concerning for fetal macrosomia 93.7%-tile, also polyhydramnios 25.00 **  HgbA1c ordered    ** PLEASE CALL DR RANDHAWA FOR AVAILABILITY WHEN PRESENTS FOR DELIVERY **        T-Dap Vaccine (27-36 weeks) Completed: Yes    Allergies:   Allergies as of 2023    (No Known Allergies)       Group Beta Strep collection was completed. Yes   GBS Results:   No visits with results within 1 Week(s) from this visit. Latest known visit with results is:   Hospital Outpatient Visit on 2022   Component Date Value Ref Range Status    Amount Glucose Given 2022 100G COLA  g Final    Glucose, Fasting 2022 89  65 - 94 mg/dL Final    Glucose, GTT - 1 Hour 2022 183 (A)  65 - 179 mg/dL Final    Glucose, GTT - 2 Hour 2022 138  65 - 154 mg/dL Final    3 Hr Glucose 2022 100  65 - 139 mg/dL Final   ]  Sensitivities for clindamycin and erythromycin were ordered. No      The patient was counseled on the mandatory call ahead policy. She has been instructed to call the office at anytime prior to going into the hospital so the on-call physician may direct her to the appropriate facility for care. Exceptions were reviewed including but not limited to: Decreased fetal movement, vaginal Bleeding or hemorrhage, trauma, readily expectant delivery, or any instance where she feels 911 should be utilized. The patient was counseled on Labor & Delivery. Route of delivery and counseling on vaginal, operative vaginal, and  sections were completed with the risks of each to both the patient as well as her baby. The possibility of a blood transfusion was discussed as well. The patient was not opposed to receiving a transfusion if needed. The patient was counseled on types of analgesia during labor and is considering either Regional or IV medication the risks, benefits and alternatives were discussed.  Testing:  None              Assessment:  Aislinn Moore is a 29 y.o. female  2.   3. 36w4d      Patient Active Problem List    Diagnosis Date Noted    History of UTI 2022     Priority: Medium     Overview Note:      In pregnancy      Normal repeat pregnancy, antepartum 2022     Priority: Medium     20 F Apg 8/9 Wt 8#0 2020        Diagnosis Orders   1. Encounter for supervision of other normal pregnancy in third trimester  Culture, Strep B Screen, Vaginal/Rectal      2. 36 weeks gestation of pregnancy  Culture, Strep B Screen, Vaginal/Rectal      3. LGA (large for gestational age) fetus affecting management of mother, third trimester, fetus 1  Hemoglobin A1C                Plan:  The patient will return to the office for her next visit in 1 weeks. See antepartum flow sheet. Counseled on s/s of preeclampsia. Counseled on s/s of  labor. Counseled on fetal movement  Counseled on EFW and plan of care. Repeat growth in 2 weeks and will have detailed discussion on R/B/A of vaginal vs. C/S         Patient was seen with total face to face time of 20 minutes. More than 50% of this visit was on counseling and education regarding her    Diagnosis Orders   1. Encounter for supervision of other normal pregnancy in third trimester  Culture, Strep B Screen, Vaginal/Rectal      2. 36 weeks gestation of pregnancy  Culture, Strep B Screen, Vaginal/Rectal      3. LGA (large for gestational age) fetus affecting management of mother, third trimester, fetus 1  Hemoglobin A1C       and her options. She was also counseled on her preventative health maintenance recommendations and follow-up.

## 2023-01-13 LAB
CULTURE: NORMAL
SPECIMEN DESCRIPTION: NORMAL

## 2023-01-17 ENCOUNTER — ROUTINE PRENATAL (OUTPATIENT)
Dept: OBGYN CLINIC | Age: 33
End: 2023-01-17

## 2023-01-17 VITALS — SYSTOLIC BLOOD PRESSURE: 126 MMHG | DIASTOLIC BLOOD PRESSURE: 72 MMHG | WEIGHT: 199 LBS | BODY MASS INDEX: 27.75 KG/M2

## 2023-01-17 DIAGNOSIS — O36.63X1 LGA (LARGE FOR GESTATIONAL AGE) FETUS AFFECTING MANAGEMENT OF MOTHER, THIRD TRIMESTER, FETUS 1: ICD-10-CM

## 2023-01-17 DIAGNOSIS — Z3A.37 37 WEEKS GESTATION OF PREGNANCY: ICD-10-CM

## 2023-01-17 DIAGNOSIS — Z34.90 NORMAL REPEAT PREGNANCY, ANTEPARTUM: Primary | ICD-10-CM

## 2023-01-17 PROCEDURE — 0502F SUBSEQUENT PRENATAL CARE: CPT | Performed by: ADVANCED PRACTICE MIDWIFE

## 2023-01-17 NOTE — PROGRESS NOTES
SUBJECTIVE:    Dennis is here for her return OB visit. denies concerns today. Wants to discussed suspected LGA at time time is still going to pursue a vaginal delivery  She reports  feeling fetal movement. She denies vaginal bleeding. She denies vaginal discharge. She denies leaking of fluid. She denies uterine cramping. She denies  nausea and/or vomiting. OBJECTIVE:  Blood pressure 126/72, weight 199 lb (90.3 kg), last menstrual period 2022, unknown if currently breastfeeding. Total weight gain: 18 lb (8.165 kg)    Dennis accepted the flu vaccine as appropriate. Dennis accepted the Tdap vaccine as appropriate  Dennis accepted the COVID vaccine as appropriate    ASSESSMENT/PLAN:  IUP @ 37+5 weeks  S =D    Normal Repeat Pregnancy  Due date is based on LMP and confirmed with 9w6d early dating ultrasound  Patient's prenatal labs are completed. Patient's blood type A positive and rhogam is not indicated in pregnancy. Early glucola indicated: no  Patient declined genetic screening. Anatomy scan completed. Placenta posterior,normal cord insertion: Yes, cervical length 4.08cm, no low lying, no previa noted. Follow up as clinically indicated. Glucola between 24-28 weeks. complete 139 Fail  89/183/138/100 (1/ elevations pass)  Total weight gain in pregnancy reviewed: Yes  Fetal Kick Count was discussed and explained. She was instructed to lay on her left side 20 minutes after eating and count movements for up to 2 hours with a target value of 10 movements. Instructed to notify office if she does not make the target.   GBS ordered and was Negative  Reviewed signs and symptoms of  labor: NA  Reviewed signs and symptoms of labor: yes  Reviewed signs and symptoms of preeclampsia: yes  Reviewed signs and symptoms of emerson hick contractions: yes  Reviewed growth ultrasound between 34-36 weeks in office: Yes scheduled  Reviewed growth ultrasound and BPP if not delivered by 40 weeks: Yes  Reviewed birth preferences: yes      2. 37 weeks gestation of pregnancy  - Reviewed 36 week ultrasound, repeat growth and ALETHEA scheduled  - Discussed in depth risks benefits and alternative to vaginal versus  delivery     3. LGA (large for gestational age) fetus affecting management of mother, third trimester, fetus 1  -Reviewed that the risks of a shoulder dystocia include but are not limited to the potential for no injury to fetus, fracture(s) of the clavicle and/or humerus, and most rare injury including  encephalopathy and fetal death. Reviewed the fetal injuries most commonly associated with macrosomia and shoulder dystocia are fracture of the clavicle and damage to the nerves of the brachial plexus which can produce Erb-Duchenne paralysis. Fractures of the clavicle complicates 9.0-9.7% of all births and typically resolves without permanent sequelae. Reviewed the reoccurrence rate for shoulder dystocia ranges for 1 to 16.7% with most studies stating recurrence to be at least 10%. Reviewed the reported incidence of shoulder dystocia among vaginal deliveries for fetuses in vertex presentation ranges from 0.2%-3%. We also reviewed that although macrosomia clearly increases risk, most instances of shoulder dystocia occur unpredictably among newborns of normal birth weight.  - Reviewed large for gestational age (varies from literature from 36g-56g without a universally accepted diagnosis). Discussed that often large for gestational age and macrosomia and used interchangeably, but that macrosomia can only be diagnosed after delivery of the . Discussed the risk of morbidity for infants and women when birth weight is either LGA or between 4,000 g and 4,500 g is more than that of the general obstetric population, and it increases sharply when the birth weight is more than 4,500g.  - Reviewed macrosomia increases the fetal morbidity and mortality rates.  Discussed macrosomia increases the risk of shoulder dystocia. Shoulder dystocia occurs in 0.2 to 3% of all vaginal deliverys and the risk increases to 9-14% when the birth weight is more than 4,500g. Reviewed the risk factor of gestational diabetes a birth weight of 4,500g or more has been associated with rates of shoulder dystocia from 20 to 50%  - Discussed macrosomia is also associated with other risks to the  including increased risks of depressed 5-minute Apgar scores, hypoglycemia, respiratory problems, polychthemia, meconium aspirations, and increases rates of admission and prolonged admission to a  intensive care unit. - Dicussed suspected fetal macrosomia is not an indication for induction of labor, because induction does not improve maternal or fetal outcomes. - Discussed labor and vaginal delivery are not contraindicated for women with estimated fetal weights up to 5,000 g (11 lbs) in the absence of maternal diabetes. - Reviewed with an estimated fetal weight more than 4,500 ( 9lbs 4oz) g, a prolonged second stage of labor or arrest of descent in the second stage is an indication for  delivery. She was counseled regarding all of the above:    Return in about 1 week (around 2023) for Return OB.     The patient, Kimberly Zheng,  was seen with a total time spent of 25 minutes for the visit on this date of service by the [facility]  On this date 2023,  I have spent greater than 50% of this visit:  [x] reviewing previous notes  [x] reviewing test results  [x] pre-charting  Discussed with patient:  [x] Importance of compliance with treatment plan  [x] Counseling  [] Coordinating care  [x] Documenting     Electronically Signed By: Lindon Halsted, APRN - CNM

## 2023-01-24 ENCOUNTER — TELEMEDICINE (OUTPATIENT)
Dept: OBGYN CLINIC | Age: 33
End: 2023-01-24

## 2023-01-24 DIAGNOSIS — Z3A.38 38 WEEKS GESTATION OF PREGNANCY: Primary | ICD-10-CM

## 2023-01-24 PROCEDURE — 0502F SUBSEQUENT PRENATAL CARE: CPT | Performed by: OBSTETRICS & GYNECOLOGY

## 2023-01-24 NOTE — PROGRESS NOTES
Miranda Cheney (:  1990) has requested an audio/video evaluation for the following concern(s). She declined a face to face visit. Fetal and Maternal morbidity & mortality risks were reviewed in detail. The increased risks of both were discussed. A face to face visit was recommended but declined/refused by the patient:    1. 38 weeks gestation of pregnancy        TELEHEALTH EVALUATION -- Audio/Visual (During ZOMSX-08 public health emergency)      Miranda Cheney is a 29 y.o. female 38w5d        OB History    Para Term  AB Living   2 1 1 0 0 1   SAB IAB Ectopic Molar Multiple Live Births   0 0 0 0 0 1      # Outcome Date GA Lbr Galindo/2nd Weight Sex Delivery Anes PTL Lv   2 Current            1 Term 20 39w6d 08:28 / 01:04 8 lb 0.2 oz (3.635 kg) F Vag-Spont EPI N DAGOBERTO       Vitals       The patient was seen and evaluated. There was positive fetal movements. No contractions or leakage of fluid. Signs and symptoms of labor were reviewed. The S/S of Pre-Eclampsia were reviewed with the patient in detail. She is to report any of these if they occur. She currently denies any of these. The patient was instructed on fetal kick counts and was given a kick sheet to complete every 8 hours. She was instructed that the baby should move at a minimum of ten times within one hour after a meal. The patient was instructed to lay down on her left side twenty minutes after eating and count movements for up to one hour with a target value of ten movements. She was instructed to notify the office if she did not make that target after two attempts or if after any attempt there was less than four movements. The patient reports that the targets have been made Yes.       GENDER SURPRISE  Plans to deliver: [facility]    FOB Ledell Starch    1st trimester screen/NIPs:    AFP    Fetal Echo    High Risk:    Early 1 hr GTT: No    Covid Vaccine: Yes Pfizer and booster     Flu Vaccine: Yes    Tdap: yes 11/15    Rhogam: NO A+    1hr GTT:  139 3hour ordered     3hr GTT:    GBS: neg    ** 23 ultrasound concerning for fetal macrosomia 93.7%-tile, also polyhydramnios 25.00 **  HgbA1c ordered      T-Dap Vaccine Completed (27-36 weeks): Yes    Allergies:  Allergies as of 2023    (No Known Allergies)         Group Beta Strep collection was completed. Yes  GBS Results:   Hospital Outpatient Visit on 01/10/2023   Component Date Value Ref Range Status    Hemoglobin A1C 01/10/2023 5.5  4.0 - 6.0 % Final    Estimated Avg Glucose 01/10/2023 111  mg/dL Final    Comment: The ADA and AACC recommend providing the estimated average glucose result to permit better   patient understanding of their HBA1c result.     Hospital Outpatient Visit on 2023   Component Date Value Ref Range Status    Specimen Description 2023 .VAGINA   Final    Culture 2023 NEGATIVE FOR GROUP B STREPTOCOCCI   Final   ]        Cervical Exam was:   Limited and not completed due to Virtual Visit Evaluation      The patient was counseled on the mandatory call ahead policy. She has been instructed to call the office at anytime prior to going into the hospital so the on-call physician may direct her to the appropriate facility for care. Exceptions were reviewed including but not limited to: Decreased fetal movement, vaginal Bleeding or hemorrhage, trauma, readily expectant delivery, or any instance where she feels 911 should be utilized.      The patient was counseled on Labor & Delivery.   Route of delivery and counseling on vaginal, operative vaginal, and  sections were completed with the risks of each to both the patient as well as her baby. The possibility of a blood transfusion was discussed as well. The patient was not opposed to receiving a transfusion if needed. The patient was counseled on types of analgesia during labor and is considering either Regional or IV medication the risks, benefits and alternatives were  discussed.  Testing:  None          Assessment:  1. Yolette Friedman is a 29 y.o. female  2.   3. 38w5d    Patient Active Problem List    Diagnosis Date Noted    History of UTI 2022     Priority: Medium     Overview Note: In pregnancy      Normal repeat pregnancy, antepartum 2022     Priority: Medium     20 F Apg 8/9 Wt 8#0 2020        Diagnosis Orders   1. 38 weeks gestation of pregnancy            Plan:  The patient will return to the office for her next visit postpartum. See antepartum flow sheet. Discussed options given fetal size, she is inquiring about induction. Discussed R/B/A of induction in detail including prolonged hospital stay, prolonged labor, slight increase rate of C/S. Also discussed fetal macrosomia if she waits and baby is >4500g including risks of this. Discussed EFW and margin for error. She would like induction of labor. Will be scheduled for  midnight of . Call scheduled reviewed with patient. Yolette Friedman is a 29 y.o. female female was evaluated by a Virtual Visit (video visit) encounter to address concerns as mentioned above. A caregiver was present when appropriate. Due to this being a TeleHealth encounter (During  public health emergency), evaluation of the following organ systems was limited: Vitals/Urine Dip/Fetal Heart Tones/Cervical Exam/Constitutional/EENT/Resp/CV/GI//MS/Neuro/Skin/Heme-Lymph-Imm. Pursuant to the emergency declaration under the [address] [place] [address], [address] [place] [address] authority and the [employer] Act, this Virtual Visit was conducted with patient's (and/or legal guardian's) consent, to reduce the patient's risk of exposure to COVID-19 and provide necessary medical care.   The patient (and/or legal guardian) has also been advised to contact this office for worsening conditions or problems, and seek emergency medical treatment and/or call 911 if deemed necessary. Services were provided through a video synchronous discussion virtually to substitute for in-person clinic visit. Patient and provider were located at their individual homes. Electronically signed by Gordon Lindo DO on 1/24/23 at 3:54 PM EST     An electronic signature was used to authenticate this note. The total Virtual Visit time of 20 minutes. More than 50% of this visit was on counseling and education regarding her    Diagnosis Orders   1. 38 weeks gestation of pregnancy         and her options. She was also counseled on her preventative health maintenance recommendations and follow-up.

## 2023-01-26 ENCOUNTER — APPOINTMENT (OUTPATIENT)
Dept: LABOR AND DELIVERY | Age: 33
End: 2023-01-26
Payer: COMMERCIAL

## 2023-01-26 ENCOUNTER — HOSPITAL ENCOUNTER (INPATIENT)
Age: 33
LOS: 2 days | Discharge: HOME | End: 2023-01-28
Attending: OBSTETRICS & GYNECOLOGY | Admitting: OBSTETRICS & GYNECOLOGY
Payer: COMMERCIAL

## 2023-01-26 PROBLEM — O09.90 HIGH-RISK PREGNANCY, UNSPECIFIED TRIMESTER: Status: ACTIVE | Noted: 2023-01-26

## 2023-01-26 LAB
ABO/RH: NORMAL
ABSOLUTE EOS #: 0.09 K/UL (ref 0–0.44)
ABSOLUTE IMMATURE GRANULOCYTE: 0.15 K/UL (ref 0–0.3)
ABSOLUTE LYMPH #: 2.22 K/UL (ref 1.1–3.7)
ABSOLUTE MONO #: 1.12 K/UL (ref 0.1–1.2)
ANTIBODY SCREEN: NEGATIVE
ARM BAND NUMBER: NORMAL
BASOPHILS # BLD: 1 % (ref 0–2)
BASOPHILS ABSOLUTE: 0.07 K/UL (ref 0–0.2)
EOSINOPHILS RELATIVE PERCENT: 1 % (ref 1–4)
EXPIRATION DATE: NORMAL
HCT VFR BLD CALC: 35 % (ref 36.3–47.1)
HEMOGLOBIN: 11.4 G/DL (ref 11.9–15.1)
IMMATURE GRANULOCYTES: 1 %
LYMPHOCYTES # BLD: 18 % (ref 24–43)
MCH RBC QN AUTO: 29 PG (ref 25.2–33.5)
MCHC RBC AUTO-ENTMCNC: 32.6 G/DL (ref 28.4–34.8)
MCV RBC AUTO: 89.1 FL (ref 82.6–102.9)
MONOCYTES # BLD: 9 % (ref 3–12)
NRBC AUTOMATED: 0 PER 100 WBC
PDW BLD-RTO: 14.5 % (ref 11.8–14.4)
PLATELET # BLD: 227 K/UL (ref 138–453)
PMV BLD AUTO: 12.6 FL (ref 8.1–13.5)
RBC # BLD: 3.93 M/UL (ref 3.95–5.11)
RBC # BLD: ABNORMAL 10*6/UL
SEG NEUTROPHILS: 70 % (ref 36–65)
SEGMENTED NEUTROPHILS ABSOLUTE COUNT: 8.46 K/UL (ref 1.5–8.1)
WBC # BLD: 12.1 K/UL (ref 3.5–11.3)

## 2023-01-26 PROCEDURE — 6370000000 HC RX 637 (ALT 250 FOR IP): Performed by: STUDENT IN AN ORGANIZED HEALTH CARE EDUCATION/TRAINING PROGRAM

## 2023-01-26 PROCEDURE — 86780 TREPONEMA PALLIDUM: CPT

## 2023-01-26 PROCEDURE — 84156 ASSAY OF PROTEIN URINE: CPT

## 2023-01-26 PROCEDURE — 86900 BLOOD TYPING SEROLOGIC ABO: CPT

## 2023-01-26 PROCEDURE — 86901 BLOOD TYPING SEROLOGIC RH(D): CPT

## 2023-01-26 PROCEDURE — 2580000003 HC RX 258: Performed by: STUDENT IN AN ORGANIZED HEALTH CARE EDUCATION/TRAINING PROGRAM

## 2023-01-26 PROCEDURE — 80307 DRUG TEST PRSMV CHEM ANLYZR: CPT

## 2023-01-26 PROCEDURE — 80053 COMPREHEN METABOLIC PANEL: CPT

## 2023-01-26 PROCEDURE — 82570 ASSAY OF URINE CREATININE: CPT

## 2023-01-26 PROCEDURE — 86850 RBC ANTIBODY SCREEN: CPT

## 2023-01-26 PROCEDURE — 1220000000 HC SEMI PRIVATE OB R&B

## 2023-01-26 PROCEDURE — 85025 COMPLETE CBC W/AUTO DIFF WBC: CPT

## 2023-01-26 RX ORDER — SODIUM CHLORIDE, SODIUM LACTATE, POTASSIUM CHLORIDE, CALCIUM CHLORIDE 600; 310; 30; 20 MG/100ML; MG/100ML; MG/100ML; MG/100ML
INJECTION, SOLUTION INTRAVENOUS CONTINUOUS
Status: DISCONTINUED | OUTPATIENT
Start: 2023-01-26 | End: 2023-01-28 | Stop reason: HOSPADM

## 2023-01-26 RX ORDER — ACETAMINOPHEN 500 MG
1000 TABLET ORAL EVERY 6 HOURS PRN
Status: DISCONTINUED | OUTPATIENT
Start: 2023-01-26 | End: 2023-01-27 | Stop reason: HOSPADM

## 2023-01-26 RX ORDER — ONDANSETRON 2 MG/ML
4 INJECTION INTRAMUSCULAR; INTRAVENOUS EVERY 6 HOURS PRN
Status: DISCONTINUED | OUTPATIENT
Start: 2023-01-26 | End: 2023-01-27 | Stop reason: HOSPADM

## 2023-01-26 RX ORDER — SODIUM CHLORIDE 0.9 % (FLUSH) 0.9 %
5-40 SYRINGE (ML) INJECTION EVERY 12 HOURS SCHEDULED
Status: DISCONTINUED | OUTPATIENT
Start: 2023-01-26 | End: 2023-01-27 | Stop reason: HOSPADM

## 2023-01-26 RX ORDER — DIPHENHYDRAMINE HCL 25 MG
25 TABLET ORAL EVERY 4 HOURS PRN
Status: DISCONTINUED | OUTPATIENT
Start: 2023-01-26 | End: 2023-01-27 | Stop reason: HOSPADM

## 2023-01-26 RX ORDER — SODIUM CHLORIDE, SODIUM LACTATE, POTASSIUM CHLORIDE, AND CALCIUM CHLORIDE .6; .31; .03; .02 G/100ML; G/100ML; G/100ML; G/100ML
1000 INJECTION, SOLUTION INTRAVENOUS PRN
Status: DISCONTINUED | OUTPATIENT
Start: 2023-01-26 | End: 2023-01-27 | Stop reason: HOSPADM

## 2023-01-26 RX ORDER — SODIUM CHLORIDE 0.9 % (FLUSH) 0.9 %
5-40 SYRINGE (ML) INJECTION PRN
Status: DISCONTINUED | OUTPATIENT
Start: 2023-01-26 | End: 2023-01-27 | Stop reason: HOSPADM

## 2023-01-26 RX ORDER — SODIUM CHLORIDE 9 MG/ML
25 INJECTION, SOLUTION INTRAVENOUS PRN
Status: DISCONTINUED | OUTPATIENT
Start: 2023-01-26 | End: 2023-01-27 | Stop reason: HOSPADM

## 2023-01-26 RX ORDER — SODIUM CHLORIDE, SODIUM LACTATE, POTASSIUM CHLORIDE, AND CALCIUM CHLORIDE .6; .31; .03; .02 G/100ML; G/100ML; G/100ML; G/100ML
500 INJECTION, SOLUTION INTRAVENOUS PRN
Status: DISCONTINUED | OUTPATIENT
Start: 2023-01-26 | End: 2023-01-27 | Stop reason: HOSPADM

## 2023-01-26 RX ADMIN — Medication 25 MCG: at 23:24

## 2023-01-26 RX ADMIN — SODIUM CHLORIDE, POTASSIUM CHLORIDE, SODIUM LACTATE AND CALCIUM CHLORIDE: 600; 310; 30; 20 INJECTION, SOLUTION INTRAVENOUS at 23:08

## 2023-01-27 ENCOUNTER — ANESTHESIA (OUTPATIENT)
Dept: LABOR AND DELIVERY | Age: 33
End: 2023-01-27
Payer: COMMERCIAL

## 2023-01-27 ENCOUNTER — ANESTHESIA EVENT (OUTPATIENT)
Dept: LABOR AND DELIVERY | Age: 33
End: 2023-01-27
Payer: COMMERCIAL

## 2023-01-27 LAB
ALBUMIN SERPL-MCNC: 3.5 G/DL (ref 3.5–5.2)
ALBUMIN/GLOBULIN RATIO: 1.1 (ref 1–2.5)
ALP BLD-CCNC: 237 U/L (ref 35–104)
ALT SERPL-CCNC: 13 U/L (ref 5–33)
AMPHETAMINE SCREEN URINE: NEGATIVE
ANION GAP SERPL CALCULATED.3IONS-SCNC: 11 MMOL/L (ref 9–17)
AST SERPL-CCNC: 20 U/L
BARBITURATE SCREEN URINE: NEGATIVE
BENZODIAZEPINE SCREEN, URINE: NEGATIVE
BILIRUB SERPL-MCNC: 0.3 MG/DL (ref 0.3–1.2)
BUN BLDV-MCNC: 6 MG/DL (ref 6–20)
CALCIUM SERPL-MCNC: 9.2 MG/DL (ref 8.6–10.4)
CANNABINOID SCREEN URINE: NEGATIVE
CHLORIDE BLD-SCNC: 103 MMOL/L (ref 98–107)
CO2: 21 MMOL/L (ref 20–31)
COCAINE METABOLITE, URINE: NEGATIVE
CREAT SERPL-MCNC: 0.59 MG/DL (ref 0.5–0.9)
CREATININE URINE: 49 MG/DL (ref 28–217)
FENTANYL URINE: NEGATIVE
GFR SERPL CREATININE-BSD FRML MDRD: >60 ML/MIN/1.73M2
GLUCOSE BLD-MCNC: 87 MG/DL (ref 70–99)
METHADONE SCREEN, URINE: NEGATIVE
OPIATES, URINE: NEGATIVE
OXYCODONE SCREEN URINE: NEGATIVE
PHENCYCLIDINE, URINE: NEGATIVE
POTASSIUM SERPL-SCNC: 3.7 MMOL/L (ref 3.7–5.3)
SODIUM BLD-SCNC: 135 MMOL/L (ref 135–144)
T. PALLIDUM, IGG: NONREACTIVE
TEST INFORMATION: NORMAL
TOTAL PROTEIN, URINE: 7 MG/DL
TOTAL PROTEIN: 6.6 G/DL (ref 6.4–8.3)
URINE TOTAL PROTEIN CREATININE RATIO: 0.14 (ref 0–0.2)

## 2023-01-27 PROCEDURE — 0HQ9XZZ REPAIR PERINEUM SKIN, EXTERNAL APPROACH: ICD-10-PCS | Performed by: OBSTETRICS & GYNECOLOGY

## 2023-01-27 PROCEDURE — 6360000002 HC RX W HCPCS: Performed by: SPECIALIST

## 2023-01-27 PROCEDURE — 62325 NJX INTERLAMINAR CRV/THRC: CPT | Performed by: ANESTHESIOLOGY

## 2023-01-27 PROCEDURE — 10907ZC DRAINAGE OF AMNIOTIC FLUID, THERAPEUTIC FROM PRODUCTS OF CONCEPTION, VIA NATURAL OR ARTIFICIAL OPENING: ICD-10-PCS | Performed by: OBSTETRICS & GYNECOLOGY

## 2023-01-27 PROCEDURE — 6360000002 HC RX W HCPCS: Performed by: ANESTHESIOLOGY

## 2023-01-27 PROCEDURE — 6370000000 HC RX 637 (ALT 250 FOR IP): Performed by: OBSTETRICS & GYNECOLOGY

## 2023-01-27 PROCEDURE — 1220000000 HC SEMI PRIVATE OB R&B

## 2023-01-27 PROCEDURE — 2580000003 HC RX 258: Performed by: OBSTETRICS & GYNECOLOGY

## 2023-01-27 PROCEDURE — 2500000003 HC RX 250 WO HCPCS: Performed by: SPECIALIST

## 2023-01-27 PROCEDURE — 2580000003 HC RX 258: Performed by: STUDENT IN AN ORGANIZED HEALTH CARE EDUCATION/TRAINING PROGRAM

## 2023-01-27 PROCEDURE — 6360000002 HC RX W HCPCS: Performed by: OBSTETRICS & GYNECOLOGY

## 2023-01-27 PROCEDURE — 3E033VJ INTRODUCTION OF OTHER HORMONE INTO PERIPHERAL VEIN, PERCUTANEOUS APPROACH: ICD-10-PCS | Performed by: OBSTETRICS & GYNECOLOGY

## 2023-01-27 PROCEDURE — 6370000000 HC RX 637 (ALT 250 FOR IP): Performed by: STUDENT IN AN ORGANIZED HEALTH CARE EDUCATION/TRAINING PROGRAM

## 2023-01-27 PROCEDURE — 3E0P7VZ INTRODUCTION OF HORMONE INTO FEMALE REPRODUCTIVE, VIA NATURAL OR ARTIFICIAL OPENING: ICD-10-PCS | Performed by: OBSTETRICS & GYNECOLOGY

## 2023-01-27 PROCEDURE — 7200000001 HC VAGINAL DELIVERY

## 2023-01-27 RX ORDER — LANOLIN 72 %
OINTMENT (GRAM) TOPICAL PRN
Status: DISCONTINUED | OUTPATIENT
Start: 2023-01-27 | End: 2023-01-28 | Stop reason: HOSPADM

## 2023-01-27 RX ORDER — ROPIVACAINE HYDROCHLORIDE 2 MG/ML
INJECTION, SOLUTION EPIDURAL; INFILTRATION; PERINEURAL
Status: COMPLETED
Start: 2023-01-27 | End: 2023-01-27

## 2023-01-27 RX ORDER — EPHEDRINE SULFATE 50 MG/ML
10 INJECTION INTRAVENOUS
Status: DISCONTINUED | OUTPATIENT
Start: 2023-01-27 | End: 2023-01-28 | Stop reason: HOSPADM

## 2023-01-27 RX ORDER — ROPIVACAINE HYDROCHLORIDE 2 MG/ML
INJECTION, SOLUTION EPIDURAL; INFILTRATION; PERINEURAL PRN
Status: DISCONTINUED | OUTPATIENT
Start: 2023-01-27 | End: 2023-01-27 | Stop reason: SDUPTHER

## 2023-01-27 RX ORDER — SODIUM CHLORIDE 0.9 % (FLUSH) 0.9 %
5-40 SYRINGE (ML) INJECTION PRN
Status: DISCONTINUED | OUTPATIENT
Start: 2023-01-27 | End: 2023-01-28 | Stop reason: HOSPADM

## 2023-01-27 RX ORDER — ONDANSETRON 2 MG/ML
4 INJECTION INTRAMUSCULAR; INTRAVENOUS EVERY 6 HOURS PRN
Status: DISCONTINUED | OUTPATIENT
Start: 2023-01-27 | End: 2023-01-27 | Stop reason: HOSPADM

## 2023-01-27 RX ORDER — NALOXONE HYDROCHLORIDE 0.4 MG/ML
INJECTION, SOLUTION INTRAMUSCULAR; INTRAVENOUS; SUBCUTANEOUS PRN
Status: DISCONTINUED | OUTPATIENT
Start: 2023-01-27 | End: 2023-01-27 | Stop reason: HOSPADM

## 2023-01-27 RX ORDER — IBUPROFEN 600 MG/1
600 TABLET ORAL EVERY 8 HOURS PRN
Status: DISCONTINUED | OUTPATIENT
Start: 2023-01-27 | End: 2023-01-28 | Stop reason: HOSPADM

## 2023-01-27 RX ORDER — DOCUSATE SODIUM 100 MG/1
100 CAPSULE, LIQUID FILLED ORAL 2 TIMES DAILY
Status: DISCONTINUED | OUTPATIENT
Start: 2023-01-27 | End: 2023-01-28 | Stop reason: HOSPADM

## 2023-01-27 RX ORDER — HYDROCORTISONE 25 MG/G
CREAM TOPICAL
Status: DISCONTINUED | OUTPATIENT
Start: 2023-01-27 | End: 2023-01-28 | Stop reason: HOSPADM

## 2023-01-27 RX ORDER — LIDOCAINE HYDROCHLORIDE AND EPINEPHRINE 15; 5 MG/ML; UG/ML
INJECTION, SOLUTION EPIDURAL PRN
Status: DISCONTINUED | OUTPATIENT
Start: 2023-01-27 | End: 2023-01-27 | Stop reason: SDUPTHER

## 2023-01-27 RX ORDER — BISACODYL 10 MG
10 SUPPOSITORY, RECTAL RECTAL DAILY PRN
Status: DISCONTINUED | OUTPATIENT
Start: 2023-01-27 | End: 2023-01-28 | Stop reason: HOSPADM

## 2023-01-27 RX ORDER — ACETAMINOPHEN 500 MG
1000 TABLET ORAL EVERY 8 HOURS PRN
Status: DISCONTINUED | OUTPATIENT
Start: 2023-01-27 | End: 2023-01-28 | Stop reason: HOSPADM

## 2023-01-27 RX ORDER — NALBUPHINE HCL 10 MG/ML
5 AMPUL (ML) INJECTION EVERY 4 HOURS PRN
Status: DISCONTINUED | OUTPATIENT
Start: 2023-01-27 | End: 2023-01-27 | Stop reason: HOSPADM

## 2023-01-27 RX ADMIN — LIDOCAINE HYDROCHLORIDE,EPINEPHRINE BITARTRATE 3 ML: 15; .005 INJECTION, SOLUTION EPIDURAL; INFILTRATION; INTRACAUDAL; PERINEURAL at 09:49

## 2023-01-27 RX ADMIN — ROPIVACAINE HYDROCHLORIDE 10 ML: 2 INJECTION, SOLUTION EPIDURAL; INFILTRATION; PERINEURAL at 09:56

## 2023-01-27 RX ADMIN — DIPHENHYDRAMINE HCL 25 MG: 25 TABLET ORAL at 03:25

## 2023-01-27 RX ADMIN — SODIUM CHLORIDE, PRESERVATIVE FREE 10 ML: 5 INJECTION INTRAVENOUS at 21:05

## 2023-01-27 RX ADMIN — IBUPROFEN 600 MG: 600 TABLET, FILM COATED ORAL at 21:04

## 2023-01-27 RX ADMIN — DOCUSATE SODIUM 100 MG: 100 CAPSULE ORAL at 21:04

## 2023-01-27 RX ADMIN — IBUPROFEN 600 MG: 600 TABLET, FILM COATED ORAL at 14:35

## 2023-01-27 RX ADMIN — ROPIVACAINE HYDROCHLORIDE 12 ML/HR: 2 INJECTION, SOLUTION EPIDURAL; INFILTRATION at 09:56

## 2023-01-27 RX ADMIN — Medication 25 MCG: at 03:23

## 2023-01-27 RX ADMIN — BENZOCAINE AND LEVOMENTHOL: 200; 5 SPRAY TOPICAL at 17:52

## 2023-01-27 RX ADMIN — SODIUM CHLORIDE, PRESERVATIVE FREE 10 ML: 5 INJECTION INTRAVENOUS at 08:30

## 2023-01-27 RX ADMIN — SODIUM CHLORIDE, POTASSIUM CHLORIDE, SODIUM LACTATE AND CALCIUM CHLORIDE: 600; 310; 30; 20 INJECTION, SOLUTION INTRAVENOUS at 06:40

## 2023-01-27 RX ADMIN — Medication 1 MILLI-UNITS/MIN: at 09:00

## 2023-01-27 ASSESSMENT — PAIN DESCRIPTION - DESCRIPTORS: DESCRIPTORS: CRAMPING

## 2023-01-27 ASSESSMENT — PAIN - FUNCTIONAL ASSESSMENT: PAIN_FUNCTIONAL_ASSESSMENT: ACTIVITIES ARE NOT PREVENTED

## 2023-01-27 ASSESSMENT — PAIN SCALES - GENERAL
PAINLEVEL_OUTOF10: 3
PAINLEVEL_OUTOF10: 3

## 2023-01-27 ASSESSMENT — PAIN DESCRIPTION - ORIENTATION: ORIENTATION: LOWER

## 2023-01-27 ASSESSMENT — PAIN DESCRIPTION - LOCATION
LOCATION: ABDOMEN
LOCATION: ABDOMEN

## 2023-01-27 NOTE — CARE COORDINATION
CASE MANAGEMENT POST-PARTUM TRANSITIONAL CARE PLAN    High-risk pregnancy, unspecified trimester [O09.90]    OB Provider: Dr Maggie Denise met w/ Claudine Melendez and Annemarie Castillo at bedside to discuss DCP. She is S/P  on 2023    Writer verified name/address/phone number correct on facesheet. She states she lives with her  and daughter Kat Kohler. [employer] insurance correct for Claudine Melendez. Writer notified Annemarie Castillo ( [birth year]) he has 30 days from date of birth to add  to his [employer]. Claudine Melendez and Annemarie Castillo verbalized understanding. Claudine Melendez confirmed a safe place for infant to sleep at home. Infant name on BC: undecided. Infant PCP Dr Cira Flood.      DME: no  HOME CARE: no    Anticipate DC of couplet 2023    Readmission Risk              Risk of Unplanned Readmission:  4

## 2023-01-27 NOTE — ANESTHESIA PRE PROCEDURE
Department of Anesthesiology  Preprocedure Note       Name:  Jane Mireles   Age:  29 y.o.  :  1990                                          MRN:  [id number]         Date:  2023      Surgeon: * No surgeons listed *    Procedure: * No procedures listed *    Medications prior to admission:   Prior to Admission medications    Medication Sig Start Date End Date Taking? Authorizing Provider   Prenatal Vit-Fe Fumarate-FA (PRENATAL COMPLETE PO) Take by mouth    Historical Provider, MD   Misc.  Devices (BREAST PUMP) MISC Double electric 19   Monika Rush DO       Current medications:    Current Facility-Administered Medications   Medication Dose Route Frequency Provider Last Rate Last Admin    oxytocin (PITOCIN) 30 units in 500 mL infusion  1-20 jeffy-units/min IntraVENous Continuous Mary Cruz DO 1 mL/hr at 23 0900 1 jeffy-units/min at 23 0900    ropivacaine (NAROPIN) 0.2% injection 0.2%             naloxone 0.4 mg in 10 mL sodium chloride syringe   IntraVENous PRN Mary Valentine MD        nalbuphine (NUBAIN) injection 5 mg  5 mg IntraVENous Q4H PRN Mary Valentine MD        [facility]) injection 4 mg  4 mg IntraVENous Q6H PRN Mary Valentine MD        ropivacaine 0.2% in sodium chloride 0.9% (OB) epidural 100 mL  10 mL/hr Epidural Continuous Mary Valentine MD        ePHEDrine injection 10 mg  10 mg IntraVENous As Directed RT PRN Mary Valentine MD        lactated ringers IV soln infusion   IntraVENous Continuous Trung Waddell  mL/hr at 23 0904 999 mL/hr at 23 5005    lactated ringers bolus  500 mL IntraVENous PRN Trung Waddell DO        Or    lactated ringers bolus  1,000 mL IntraVENous PRN Trung Waddell DO        sodium chloride flush 0.9 % injection 5-40 mL  5-40 mL IntraVENous 2 times per day Trung Waddell DO        sodium chloride flush 0.9 % injection 5-40 mL  5-40 mL IntraVENous PRN Trung Waddell DO        0.9 % sodium chloride infusion  25 mL IntraVENous PRN Tashia Paez, DO        ondansetron [facility]) injection 4 mg  4 mg IntraVENous Q6H PRN Tashia Paez, DO        diphenhydrAMINE (BENADRYL) tablet 25 mg  25 mg Oral Q4H PRN Tashia Paez, DO   25 mg at 23 0325    acetaminophen (TYLENOL) tablet 1,000 mg  1,000 mg Oral Q6H PRN Tashia Paez, DO           Allergies:  No Known Allergies    Problem List:    Patient Active Problem List   Diagnosis Code     20 F Apg 8/9 Wt 8#0 Z39.2    History of UTI Z87.440    Normal repeat pregnancy, antepartum Z34.90    High-risk pregnancy, unspecified trimester O09.90       Past Medical History:  No past medical history on file. Past Surgical History:        Procedure Laterality Date    WISDOM TOOTH EXTRACTION         Social History:    Social History     Tobacco Use    Smoking status: Never    Smokeless tobacco: Never   Substance Use Topics    Alcohol use: No                                Counseling given: Not Answered      Vital Signs (Current):   Vitals:    23 0322 23 0542 23 0733 23 0900   BP: 135/79 113/63 (!) 137/93 118/68   Pulse: 72 85 93 97   Resp: 16 18 16 16   Temp: 36.3 °C (97.3 °F)  36.4 °C (97.5 °F)    TempSrc: Oral  Oral    SpO2:    96%   Weight:       Height:                                                  BP Readings from Last 3 Encounters:   23 118/68   23 126/72   23 110/72       NPO Status:                                                                                 BMI:   Wt Readings from Last 3 Encounters:   23 200 lb (90.7 kg)   23 199 lb (90.3 kg)   23 198 lb (89.8 kg)     Body mass index is 27.89 kg/m².     CBC:   Lab Results   Component Value Date/Time    WBC 12.1 2023 11:17 PM    RBC 3.93 2023 11:17 PM    HGB 11.4 2023 11:17 PM    HCT 35.0 2023 11:17 PM    MCV 89.1 2023 11:17 PM    RDW 14.5 2023 11:17 PM     2023 11:17 PM CMP:   Lab Results   Component Value Date/Time     01/26/2023 11:18 PM    K 3.7 01/26/2023 11:18 PM     01/26/2023 11:18 PM    CO2 21 01/26/2023 11:18 PM    BUN 6 01/26/2023 11:18 PM    CREATININE 0.59 01/26/2023 11:18 PM    GFRAA >60 02/15/2020 07:54 AM    LABGLOM >60 01/26/2023 11:18 PM    GLUCOSE 87 01/26/2023 11:18 PM    PROT 6.6 01/26/2023 11:18 PM    CALCIUM 9.2 01/26/2023 11:18 PM    BILITOT 0.3 01/26/2023 11:18 PM    ALKPHOS 237 01/26/2023 11:18 PM    AST 20 01/26/2023 11:18 PM    ALT 13 01/26/2023 11:18 PM       POC Tests: No results for input(s): POCGLU, POCNA, POCK, POCCL, POCBUN, POCHEMO, POCHCT in the last 72 hours. Coags: No results found for: PROTIME, INR, APTT    HCG (If Applicable):   Lab Results   Component Value Date    HCGQUANT 159,509 (H) 07/15/2019        ABGs: No results found for: PHART, PO2ART, NGR2WHL, CTL0OWK, BEART, U1WOUPXS     Type & Screen (If Applicable):  No results found for: LABABO, LABRH    Drug/Infectious Status (If Applicable):  Lab Results   Component Value Date/Time    HEPCAB NONREACTIVE 07/07/2022 05:05 PM       COVID-19 Screening (If Applicable):   Lab Results   Component Value Date/Time    COVID19 Not Detected 07/27/2020 08:35 AM           Anesthesia Evaluation  Patient summary reviewed  Airway: Mallampati: II          Dental: normal exam         Pulmonary:Negative Pulmonary ROS and normal exam                               Cardiovascular:Negative CV ROS            Rhythm: regular  Rate: normal                    Neuro/Psych:   Negative Neuro/Psych ROS              GI/Hepatic/Renal:   (+) GERD:,           Endo/Other: Negative Endo/Other ROS                    Abdominal:             Vascular: negative vascular ROS. Other Findings:           Anesthesia Plan      epidural     ASA 2             Anesthetic plan and risks discussed with patient. Use of blood products discussed with patient whom.                      SONIA Arnold - CRNA 1/27/2023

## 2023-01-27 NOTE — FLOWSHEET NOTE
Patient admitted to room 749 from L&D via . Oriented to room and surroundings. Plan of care reviewed. Verbalized understanding. Instructed on infant security and safe sleep practices. Preventing falls education provided . The following handouts given: A New Beginning: Your Guide to Postpartum Care, Rounding, gs Security System,Babies Cry A lot, Safe Sleep, Security and Visitation Guidelines. Call light placed within reach.

## 2023-01-27 NOTE — ANESTHESIA POSTPROCEDURE EVALUATION
Department of Anesthesiology  Postprocedure Note    Patient: Vincent Angela  MRN: [id number]  [place]: 1990  Date of evaluation: 1/27/2023      Procedure Summary     Date: 01/27/23 Room / Location:     Anesthesia Start: 3938 Anesthesia Stop: 4732    Procedure: Labor Analgesia Diagnosis:     Scheduled Providers:  Responsible Provider: Armin Norwood MD    Anesthesia Type: epidural ASA Status: 2          Anesthesia Type: No value filed.     Monica Phase I: Monica Score: 10    Monica Phase II:        Anesthesia Post Evaluation    Patient location during evaluation: bedside  Patient participation: complete - patient participated  Level of consciousness: awake and alert  Pain score: 0  Airway patency: patent  Nausea & Vomiting: no nausea and no vomiting  Complications: no  Cardiovascular status: blood pressure returned to baseline and hemodynamically stable  Respiratory status: acceptable  Hydration status: stable

## 2023-01-27 NOTE — PROGRESS NOTES
Patient seen and examined. She is comfortable but is feeling some cramping. SVE: 3/50/-1, AROM performed revealing clear fluid     Pitocin ordered. Continue management of IOL. Anticipate .      Carolina Alvarado DO  7814, 7:47 AM

## 2023-01-27 NOTE — L&D DELIVERY NOTE
Mother's Information      Labor Events     Labor?: No  Cervical Ripening:   Now               Dyana Kenny Girl Payton Goddard [[id number]]      Labor Events     Labor?: No   Steroids?: None  Cervical Ripening Date/Time:     Antibiotics Received during Labor?: No  Rupture Identifier: Sac 1   Rupture Date/Time: 23 07:43:00   Rupture Type: AROM  Fluid Color: Clear  Fluid Odor: None  Fluid Volume: Large  Induction: AROM, Misoprostol, Oxytocin  Augmentation: None  Labor Complications: None       Anesthesia    Method: Epidural       Start Pushing      Labor onset date/time:   Now     Dilation complete date/time: 23 12:10:00 Now     Start pushing date/time: 2023 12:14:00   Decision date/time (emergent ):           Delivery ([place])      Delivery Date/Time:  23 12:21:00   Delivery Type: Vaginal, Spontaneous    Details:            [place] Presentation    Presentation: Vertex  _: Occiput       Shoulder Dystocia    Shoulder Dystocia Present?: No  Add Second Maneuver  Add Third Maneuver  Add Fourth Maneuver  Add Fifth Maneuver  Add Sixth Maneuver  Add Seventh Maneuver  Add Eighth Maneuver  Add Ninth Maneuver       Assisted Delivery Details    Forceps Attempted?: No  Vacuum Extractor Attempted?: No       Document Additional Attempt         Document Additional Attempt                 Cord    Vessels: 3 Vessels  Complications: None  Delayed Cord Clamping?: Yes  Cord Clamped Date/Time: 2023 12:22:00  Cord Blood Disposition: Lab  Gases Sent?: Yes       Placenta    Date/Time: 2023 12:25:00  Removal: Spontaneous  Appearance: Intact  Disposition: Discarded       Lacerations    Episiotomy: None  Perineal Lacerations: 1st  Other Lacerations: no non-perineal laceration  Number of Repair Packets: 1       Vaginal Counts    Initial Count Personnel: KN  Initial Count Verified By: DR. RAWLS    Sponges Needles Instruments   Initial Counts Correct Correct Correct   Final Counts Correct Correct Correct   Final Count Personnel: Tobyj 75  Final Count Verified By: DR. RAWLS  Accurate Final Count?: Yes  If the count is incorrect due to Intentionally Retained Foreign Object (IRFO) add the IRFO LDA in Lines/Drains. Add LDA: [facility] to [facility]       Blood Loss  Mother: Ilia Flores #[id number]     Start of Mother's Information      Delivery Blood Loss  23 0021 - 23 1304      None                 End of Mother's Information  Mother: Ilia Flores #[id number]                Delivery Providers    Delivering clinician: Yvon Goddard DO     Provider Role    Yvon Goddard DO Obstetrician    Cherelle Gorman, RN Primary Nurse    Chioma Baig, RN Primary  Nurse     NICU Nurse     Neonatologist     Anesthesiologist     Nurse Anesthetist     Nurse Practitioner     Midwife     Nursery Nurse    Get Braga DO Obstetrician               Assessment    Living Status: Living  Delivery Location Comment: 707     Apgar Scoring Key:    0 1 2    Skin Color: Blue or pale Acrocyanotic Completely pink    Heart Rate: Absent <100 bpm >100 bpm    Reflex Irritability: No response Grimace Cry or active withdrawal    Muscle Tone: Limp Some flexion Active motion    Respiratory Effort: Absent Weak cry; hypoventilation Good, crying                      Skin Color:   Heart Rate:   Reflex Irritability:   Muscle Tone:   Respiratory Effort:    Total:            1 Minute:    0    2    2    2    2    8        Apgar 1 total from OB History    5 Minute:    1    2    2    2    2    9        Apgar 5 total from OB History    10 Minute:              15 Minute:              20 Minute:                        Apgars Assigned By: BOAZ MALONE,RN              Resuscitation    Method: Bulb Suction, Room Air, Stimulation             [place] Measurements      Birth Weight: 4245 g Birth Length: 0.521 m              Title      Skin to Skin Initiation Date/Time: 23 12:22:00 EST     Skin to Skin With: Mother     Skin to Skin End Date/Time:                      Vaginal Delivery Note  Department of Obstetrics and Gynecology  [address] [facility]       Patient: Eliza Moody   : 1990  MRN: [id number]   Date of delivery: 23     Pre-operative Diagnosis: Eliza Moody  at 39w1d  Risk Reducing IOL   Elevated 1 hour, normal 3 hour    Post-operative Diagnosis:  Same as above and  living infant female    Delivering Obstetrician & Assistant(s): Bandar Rodriguez DO; Winifred Lyons DO    Infant Information:     Apgar scores: 8 at 1 minute and 9 at 5 minutes. Anesthesia:  epidural anesthesia    Application and Delivery:    She was known to be GBS negative and did not receive antibiotic prophylaxis. The patient progressed well, received an epidural, became complete and felt the urge to push. After pushing with contractions the fetal head delivered Cephalic, left occiput anterior over an intact perineum, nuchal cord was not present. The anterior, then posterior shoulder delivered easily and atraumatically followed by the rest of the infant. Nose and mouth suctioned with bulb suction, infant was stimulated and dried. Cord was clamped and cut after one minute delayed cord clamping and infant was placed on maternal abdomen, and attended by RN for evaluation. The delivery of the placenta was spontaneous and appeared intact and that the umbilical cord had three vessels noted. Pitocin was started. The vagina was swept of all clots and debris. The perineum and vagina were evaluated and a midline 1st degree and perineal laceration was found and repaired in standard fashion with 3-0 vicryl. Mother and baby tolerated procedure well.        Specimen: cord blood and cord gases  Quantitative blood loss:  50ml immediately following delivery  Condition:  infant stable to general nursery and mother stable  Counts: instrument and sponge counts correct  Blood Type and Rh: A POSITIVE      Winifred Lyons DO  Ob/Gyn 1/27/2023, 1:04 PM

## 2023-01-27 NOTE — LACTATION NOTE
Breastfeeding packet given and reviewed. Pt states baby nursed well in recovery and did supplement as well d/t low blood sugar. Reviewed hand expression, deep latch, and encouraged to call out for assistance as needed.

## 2023-01-27 NOTE — PROGRESS NOTES
Labor progress note    Doing very well. Feels some pressure. Vitals:    23 1006   BP: 129/80   Pulse: 100   Resp: 16   Temp: 97.9 °F (36.6 °C)   SpO2: 96%     FHT: baseline 135, mod shelli, +accels, no decels, Cat 1  [place]: contractions q 2-3  SVE: c/5/0    A/P 30 yo  at 39w1d who presents for rrIOL doing well  (1) GBS negative  (2) CEI working well  (3) Cat 1 tracing  (4) EFW 9#9, elevated 1 hr GTT and normal 3hr GTT. Proven pelvis to 8lb. Counseled re shoulder dystocia. Pelvis feel adequate, staying on the labor curve.

## 2023-01-27 NOTE — PROGRESS NOTES
POST PARTUM DAY # 1    Miranda Cheney is a 29 y.o. female  This patient was seen & examined today. Her pregnancy was complicated by:   Patient Active Problem List   Diagnosis     22 F Apg 8/9 Wt 8#0    History of UTI    Normal repeat pregnancy, antepartum     25 F Apg 8,9 Wt 9#5    gHTN (G2)       Today she is doing well without any chief complaint. Her lochia is light. She denies chest pain, shortness of breath, headache, and lightheadedness. She is  breast feeding and she denies any breast tenderness. She is ambulating well. Her voiding pattern is normal. I reviewed signs and symptoms of post partum depression with the patient, she currently denies any of these symptoms. She is tolerating solids.      Vital Signs:  Vitals:    23 1430 23 1505 23 2200 23 0433   BP: 126/84 137/88 124/84 (!) 127/95   Pulse: 92 87 84 80   Resp: 16 16 17 17   Temp:  97.7 °F (36.5 °C) 97.5 °F (36.4 °C)    TempSrc:       SpO2:  98% 96%    Weight:       Height:             Physical Exam:  General:  no apparent distress and alert  Neurologic:  alert, oriented, normal speech, no focal findings or movement disorder noted  Lungs:  No increased work of breathing, good air exchange, clear to auscultation bilaterally, no crackles or wheezing  Heart:  regular rate and rhythm    Abdomen: abdomen soft, non-distended, non-tender  Fundus: non-tender, normal size, firm, below umbilicus  Extremities:  no calf tenderness, non edematous    Lab:  Lab Results   Component Value Date    HGB 11.4 (L) 2023     Lab Results   Component Value Date    HCT 35.0 (L) 2023       Assessment/Plan:  Miranda Cheney is a [id number] PPD # 1 s/p    - Doing well, VSS   - female infant in [place] [address] Nursery   - Encourage ambulation   - Postpartum Hgb/Hct if symptomatic  Rh positive/Rubella immune  Breast feeding   gHTN   - Pre-e labs WNL    - Denies s/s preE  Continue post partum care    Counseling Completed:  Secondary Smoke risks and Sudden Infant Death Syndrome were reviewed with recommendations. Infant sleeping, \"back to sleep\" and avoidance of co-sleeping recommendations were reviewed. Signs and Symptoms of Post Partum Depression were reviewed. The patient is to call if any occur. Signs and symptoms of Mastitis were reviewed. The patient is to call if any occur for follow up. Discharge instructions including pelvic rest, no driving with pain medicine and office follow-up were reviewed with patient     Attending Physician: Dr. Hussain Burdick DO  Ob/Gyn Resident   1/28/2023, 6:34 AM     Attending Physician Statement  I have discussed the care of Monique Arce, including pertinent history and exam findings with the resident. I have reviewed and edited their note in the electronic medical record. The key elements of all parts of the encounter have been performed/reviewed by me . I agree with the assessment, plan and orders as documented by the resident. The level of care submitted represents to the best of my ability the care documented in the medical record today. GC Modifier. This service has been performed in part by a resident under the direction of a teaching physician.       Attending's Name:  Coco Ring DO

## 2023-01-27 NOTE — CARE COORDINATION
ANTEPARTUM NOTE    High-risk pregnancy, unspecified trimester [O09.90]    Dea Victoria was admitted to L&D on 1/26/2023 for RR IOL @ 44 0/7    OB GYN Provider: Dr Liz Todd    Will meet with patient after delivery to verify name/address/phone/insurance and discuss discharge planning. Anticipate DC home 2 nights after vaginal delivery or 4 nights after C/S delivery as long as hemodynamically stable.

## 2023-01-27 NOTE — ANESTHESIA PROCEDURE NOTES
Epidural Block    Patient location during procedure: pre-op  Start time: 1/27/2023 9:39 AM  End time: 1/27/2023 9:48 AM  Reason for block: procedure for pain, labor epidural and at surgeon's request  Staffing  Performed: resident/CRNA   Resident/CRNA: SONIA Arnold CRNA  Epidural  Patient position: sitting  Prep: Betadine  Patient monitoring: cardiac monitor, continuous pulse ox and frequent blood pressure checks  Approach: midline  Location: L4-5  Injection technique: KT air  Provider prep: mask and sterile gloves  Needle  Needle type: Tuohy   Needle gauge: 17 G  Needle length: 3.5 in  Needle insertion depth: 6 cm  Catheter type: end hole  Catheter size: 19 G  Catheter at skin depth: 13 cm  Test dose: negativeCatheter Secured: tegaderm and tape  Assessment  Sensory level: T6  Hemodynamics: stable  Attempts: 1  Outcomes: uncomplicated and patient tolerated procedure well  Preanesthetic Checklist  Completed: patient identified, IV checked, risks and benefits discussed, surgical/procedural consents, equipment checked, pre-op evaluation, timeout performed, anesthesia consent given, oxygen available and monitors applied/VS acknowledged

## 2023-01-27 NOTE — FLOWSHEET NOTE
[address], CRNA at bedside. Epidural procedure explained, risks discussed. Pt verbalizes consent for epidural.   [id number] patient positioned for epidural.0931 Time out completed. 0488 catheter placed. 0949 test dose given, . Epidural catheter taped and secured per anesthesia. 7996 to low fowlers with left uterine displacement. 0956 loading dose given. 1002 pump initiated. Pt tolerated procedure well.

## 2023-01-27 NOTE — H&P
OBSTETRICAL HISTORY [facility]    Date: 2023       Time: 11:20 PM   Patient Name: Miranda Cheney     Patient : 1990  Room/Bed: 89/5762-28    Admission Date/Time: 2023  9:59 PM      CC: RR IOL      HPI: Miranda Cheney is a 29 y.o.  at 39w0d who presents for a risk reducing induction of labor. She has no com The patient reports fetal movement is present, denies contractions, denies loss of fluid, denies vaginal bleeding. Patient denies headache, vision changes, nausea, vomiting, fever, chills, shortness of breath, chest pain, RUQ pain, abdominal pain, diarrhea, change in color/amount/odor of vaginal discharge, dysuria or, hematuria. DATING:  LMP: Patient's last menstrual period was 2022 (exact date).   Estimated Date of Delivery: 23   Based on: early ultrasound, at 5 6/7 weeks GA    PREGNANCY RISK FACTORS:  Patient Active Problem List   Diagnosis     22 F Apg 8/9 Wt 8#0    History of UTI    Normal repeat pregnancy, antepartum    High-risk pregnancy, unspecified trimester        Steroids Given In This Pregnancy:  no     REVIEW OF SYSTEMS:   Constitutional: negative fever, negative chills, negative weight changes   HEENT: negative visual disturbances, negative headaches, negative dizziness, negative hearing loss  Breast: Negative breast abnormalities, negative breast lumps, negative nipple discharge  Respiratory: negative dyspnea, negative cough, negative SOB  Cardiovascular: negative chest pain,  negative palpitations, negative arrhythmia, negative syncope   Gastrointestinal: negative abdominal pain, negative RUQ pain, negative N/V, negative diarrhea, negative constipation, negative bowel changes, negative heartburn   Genitourinary: negative dysuria, negative hematuria, negative urinary incontinence, negative vaginal discharge, negative vaginal bleeding or spotting  Dermatological: negative rash, negative pruritis, negative mole or other skin changes  Hematologic: negative bruising  Immunologic/Lymphatic: negative recent illness, negative recent sick contact  Musculoskeletal: negative back pain, negative myalgias, negative arthralgias  Neurological:  negative dizziness, negative migraines, negative seizures, negative weakness  Behavior/Psych: negative depression, negative anxiety, negative SI, negative HI    OBSTETRIC HISTORY:   OB History    Para Term  AB Living   2 1 1 0 0 1   SAB IAB Ectopic Molar Multiple Live Births   0 0 0 0 0 1      # Outcome Date GA Lbr Galindo/2nd Weight Sex Delivery Anes PTL Lv   2 Current            1 Term 20 39w6d 08:28 / 01:04 8 lb 0.2 oz (3.635 kg) F Vag-Spont EPI N DAGOBERTO      Name: Murl Los: 8  Apgar5: 9       PAST MEDICAL HISTORY:   has no past medical history on file. PAST SURGICAL HISTORY:   has a past surgical history that includes [employer] tooth extraction. ALLERGIES:  has No Known Allergies. MEDICATIONS:  Prior to Admission medications    Medication Sig Start Date End Date Taking? Authorizing Provider   Prenatal Vit-Fe Fumarate-FA (PRENATAL COMPLETE PO) Take by mouth    Historical Provider, MD   [facility] [place] Devices (BREAST PUMP) MISC Double electric 19   Forest Rush DO       FAMILY HISTORY:  family history includes Diabetes in her paternal grandfather. SOCIAL HISTORY:   reports that she has never smoked. She has never used smokeless tobacco. She reports that she does not drink alcohol and does not use drugs.     VITALS:  Vitals:    23 2221 23 2223 23 2224 23 2236   BP:  (!) 139/92  (!) 153/99   Pulse:  97  94   Resp: 18      Temp: 96.8 °F (36 °C)      TempSrc: Oral      SpO2: 96%      Weight:   200 lb (90.7 kg)    Height:   5' 11\" (1.803 m)          PHYSICAL EXAM:  Fetal Heart Monitor:  Baseline Heart Rate 140, moderate variability, present accelerations, absent decelerations  [employer]: contractions, irregular    General appearance:  no apparent distress, alert and cooperative  HEENT: head atraumatic, normocephalic, moist mucous membranes, trachea midline  Neurologic:  alert, oriented, normal speech, no focal findings or movement disorder noted  Lungs:  No increased work of breathing, good air exchange, clear to auscultation bilaterally, no crackles or wheezing  Heart:  regular rate and rhythm and no murmur, rubs, gallops  Abdomen:  soft, gravid, non-tender, no rebound, guarding, or rigidity, no RUQ or epigastric tenderness, no signs or symptoms of abruption, no signs or symptoms of chorioamnionitis  Extremities:  no calf tenderness, non edematous, no varicosities, full range of motion in all four extremities  Musculoskeletal: Gross strength equal and intact throughout, no gross abnormalities, range of motion normal in hips, knees, shoulders and spine  Psychiatric: Mood appropriate, normal affect   Rectal Exam: not indicated  Pelvic Exam:   Chaperone for Intimate Exam: Chaperone was present for entire exam, Chaperone Name: Андрей Rivera, RN  Sterile Vaginal Exam:  Cervix: No cervical motion tenderness   Uterus: Is gravid, Normal size, shape, consistency and non-tender   Adnexa: Non-tender, no palpable masses  Cervix: 1 cm dilated, 50 % effaced, -2 station, posterior position (out of 3 station), medium consistency, FETAL POSITION: Cephalic (confirmed by ultrasound), Membranes intact,      LIMITED BEDSIDE US:  Position: cephalic  Placental Location: posterior fundal  Fetal Heart Tones: Present  Fetal Movement: Present   Amniotic Fluid Index/Volume:  adequate 2x2 cm fluid pocket  Estimated Fetal Weight:  9 lbs 9oz    PRENATAL LAB RESULTS:   Blood Type/Rh: A pos  Antibody Screen: negative  Hemoglobin, Hematocrit, Platelets: 53.8/02.7/631  Rubella: immune  T.  Pallidum, IgG: non-reactive  Hepatitis B Surface Antigen: non-reactive   Hepatitis C Antibody: non-reactive   HIV: non-reactive   Gonorrhea: negative  Chlamydia: negative  Urine culture: negative, date: 2022, 22      1 hour Glucose Tolerance Test:  139  3 hour Glucose Tolerance Test: Fasting 89/ 1hr 183/ 2 hr 138/ 3 hr 100    Group B Strep: negative on 23  Cystic Fibrosis Screen: negative  Sickle Cell Screen: not available  First Trimester Screen: not available  QUAD: not available  MSAFP: na  Non-Invasive Prenatal Testing: not available  Anatomy US: posterior fundal placenta, 3VC, female gender, normal anatomy    ASSESSMENT & PLAN:  Halie Hopkins is a 29 y.o. female  at 39w0d    - GBS negative / Rh positive / R immune   - No indication for GBS prophylaxis      RR IOL  - Admit to labor and delivery under the service of Dr. Liyah Medellin   - VSS, afebrile    - Cat 1 FHT and TOCO showing irregular contractions   - SVE: 1 cm/50%/-2 station   - BSUS: cephalic presentation, posterior fundal placenta, EFW 9#9oz   - CBC, T&S, T.Pal ordered   - UDS ordered. R/B/A discussed with patient and patient agreeable   - Maintain cEFM and TOCO    - LR IVF @ 125 mL/hr   - Plan of Induction: Cytotec 25 PV x 1   - Patient counseled on risk of shoulder dystocia. Advised of risk of fetal bruising and fracture, maternal vaginal laceration, potential for permanent brachial plexus injury, and fetal hypoxia that, in rare cases, can lead to permanent neurologic injury or death. Patient verbalizes understanding and wishes to proceed with vaginal delivery. Elevated 1 hour GTT   - 1 elevated value on 3 hour GTT    Elevated BP on admission   - Will obtain preE labs   - Denies s/s preE    BMI 27.89          Patient Active Problem List    Diagnosis Date Noted    High-risk pregnancy, unspecified trimester 2023     Priority: Medium    History of UTI 2022     Priority: Medium     In pregnancy      Normal repeat pregnancy, antepartum 2022     Priority: Medium     22 F Apg 8/9 Wt 8#0 2020       Plan discussed with Dr. Liyah Medellin, who is agreeable.      Steroids given this admission: No    Risks, benefits, alternatives and possible complications have been discussed in detail with the patient. Admission, and post admission procedures and expectations were discussed in detail. All questions were answered.     Attending's Name: Dr. Damaris Pires DO  Ob/Gyn Resident  1/26/2023, 11:20 PM

## 2023-01-27 NOTE — PROGRESS NOTES
Labor Progress Note    Will Odonnell is a 29 y.o. female  at 39w1d  The patient was seen and examined. Her pain is well controlled. She reports fetal movement is present, complains of contractions, denies loss of fluid, denies vaginal bleeding.        Vital Signs:  Vitals:    23 2326 23 0022 23 0322 23 0542   BP: (!) 132/90 123/76 135/79 113/63   Pulse: 89 87 72 85   Resp: 18 16 16 18   Temp:   97.3 °F (36.3 °C)    TempSrc:   Oral    SpO2:       Weight:       Height:             FHT: 140, moderate variability, accelerations present, decelerations absent  Contractions: irregular        Membranes: Intact  Scalp Electrode in place: absent  Intrauterine Pressure Catheter in Place: absent    Interventions: none    Assessment/Plan:  Will Odonnell is a 29 y.o. female  at 39w1d admitted for RR IOL   - GBS negative, No indication for GBS prophylaxis   - VSS, hypertensive on admission now normotensive   - cEFM/TOCO   - Cytotec x2   - Continue current care     Elevated Bps   - Does not meet criteria for hypertensive dx   - Denies s/s preE   - PreE labs wnl, P/C 0.14   - Continue to monitor     Attending updated and in agreement with sagar Vela DO  Ob/Gyn Resident  2023, 3:12 AM

## 2023-01-27 NOTE — DISCHARGE SUMMARY
Obstetric Discharge Summary  [facility]    Patient Name: Vincent Angela  Patient : 1990  Primary Care Physician: On File Not (Inactive)  Admit Date: 2023    Principal Diagnosis: IUP at 39w0d, admitted for RR IOL     Her pregnancy has been complicated by:   Patient Active Problem List   Diagnosis     20 F Apg 8/9 Wt 8#0    History of UTI    Normal repeat pregnancy, antepartum     23 F Apg 8,9 Wt 9#5    gHTN (G2)       Infection Present?: No  Hospital Acquired: No    Surgical Operations & Procedures:  Analgesia: epidural  Delivery Type: Spontaneous Vaginal Delivery: See Labor and Delivery Summary   Laceration(s): First degree laceration. Suture used for repair:  Vicryl 3.0. Consultations: Anesthesia    Pertinent Findings & Procedures:   Vincent Angela is a 29 y.o. female  at 39w0d admitted for risk reducing induction of labor; received Cytotec 25 mcg PV x 1, buccal x 1, AROM performed and Pitocin started. She met criteria for gHTN, preE labs wnl P/C 0.14. She delivered by spontaneous vaginal a Live Born infant on 23. Information for the patient's :  Elliot Levine [[id number]]   female   Birth Weight: 9 lb 5.7 oz (4.245 kg)     Apgars: 8 at 1 minute and 9 at 5 minutes.      Postpartum course: normal.      Course of patient: complicated by new diagnosis of gestational hypertension    Discharge to: Home    Readmission planned: no     Recommendations on Discharge:     Medications:      Medication List        START taking these medications      docusate sodium 100 MG capsule  Commonly known as: COLACE  Take 1 capsule by mouth 2 times daily     ibuprofen 600 MG tablet  Commonly known as: ADVIL;MOTRIN  Take 1 tablet by mouth every 6 hours as needed for Pain            CONTINUE taking these medications      Breast Pump Misc  Double electric     PRENATAL COMPLETE PO               Where to Get Your Medications        These medications were sent to [facility] [id number] [place], [place] - [address] [facility] -  [phone]  [address] [facility], [address] [place] [address]      Phone: [phone]   docusate sodium 100 MG capsule  ibuprofen 600 MG tablet           Activity: pelvic rest x 6 weeks  Diet: regular diet  Follow up: 1 week for BP check    Condition on discharge: stable    Discharge date: 1/28/23    Halley Hernandez DO  Ob/Gyn Resident    Comments:  Home care and follow-up care were reviewed. Pelvic rest, and birth control were reviewed. Signs and symptoms of mastitis and post partum depression were reviewed. The patient is to notify her physician if any of these occur. The patient was counseled on secondary smoke risks and the increased risk of sudden infant death syndrome and respiratory problems to her baby with exposure. She was counseled on various alternate recommendations to decrease the exposure to secondary smoke to her children.

## 2023-01-28 VITALS
OXYGEN SATURATION: 97 % | WEIGHT: 200 LBS | TEMPERATURE: 97.7 F | HEART RATE: 92 BPM | BODY MASS INDEX: 28 KG/M2 | HEIGHT: 71 IN | RESPIRATION RATE: 16 BRPM | DIASTOLIC BLOOD PRESSURE: 86 MMHG | SYSTOLIC BLOOD PRESSURE: 122 MMHG

## 2023-01-28 PROCEDURE — 6370000000 HC RX 637 (ALT 250 FOR IP): Performed by: OBSTETRICS & GYNECOLOGY

## 2023-01-28 RX ORDER — DOCUSATE SODIUM 100 MG/1
100 CAPSULE, LIQUID FILLED ORAL 2 TIMES DAILY PRN
Qty: 60 CAPSULE | Refills: 1 | Status: SHIPPED | OUTPATIENT
Start: 2023-01-28

## 2023-01-28 RX ORDER — IBUPROFEN 600 MG/1
600 TABLET ORAL EVERY 6 HOURS PRN
Qty: 30 TABLET | Refills: 1 | Status: SHIPPED | OUTPATIENT
Start: 2023-01-28

## 2023-01-28 RX ORDER — DOCUSATE SODIUM 100 MG/1
100 CAPSULE, LIQUID FILLED ORAL 2 TIMES DAILY
Qty: 60 CAPSULE | Refills: 1 | Status: SHIPPED | OUTPATIENT
Start: 2023-01-28 | End: 2023-01-28 | Stop reason: SDUPTHER

## 2023-01-28 RX ORDER — IBUPROFEN 600 MG/1
600 TABLET ORAL EVERY 6 HOURS PRN
Qty: 40 TABLET | Refills: 1 | Status: SHIPPED | OUTPATIENT
Start: 2023-01-28 | End: 2023-01-28 | Stop reason: SDUPTHER

## 2023-01-28 RX ADMIN — IBUPROFEN 600 MG: 600 TABLET, FILM COATED ORAL at 04:53

## 2023-01-28 RX ADMIN — IBUPROFEN 600 MG: 600 TABLET, FILM COATED ORAL at 11:36

## 2023-01-28 RX ADMIN — DOCUSATE SODIUM 100 MG: 100 CAPSULE ORAL at 08:34

## 2023-01-28 ASSESSMENT — PAIN DESCRIPTION - DESCRIPTORS
DESCRIPTORS: CRAMPING
DESCRIPTORS: CRAMPING

## 2023-01-28 ASSESSMENT — PAIN DESCRIPTION - LOCATION
LOCATION: ABDOMEN
LOCATION: ABDOMEN

## 2023-01-28 ASSESSMENT — PAIN SCALES - GENERAL
PAINLEVEL_OUTOF10: 3
PAINLEVEL_OUTOF10: 3

## 2023-01-28 ASSESSMENT — PAIN DESCRIPTION - ORIENTATION: ORIENTATION: LOWER

## 2023-01-28 ASSESSMENT — PAIN - FUNCTIONAL ASSESSMENT: PAIN_FUNCTIONAL_ASSESSMENT: ACTIVITIES ARE NOT PREVENTED

## 2023-01-28 NOTE — LACTATION NOTE
Mom reports that her baby is nursing well and comfortably. Reviewed feeding frequency. Encouraged her to call out for assistance as needed.

## 2023-01-28 NOTE — FLOWSHEET NOTE
I have reviewed all AWHONN Post-Birth Warning Signs and essential teaching points for pulmonary embolism, cardiac disease, hypertensive disorders of pregnancy, obstetric hemorrhage, venous thromboembolism, infection, and postpartum depression with the patient and FOBID bands checked. Discharge teaching complete, discharge instructions signed, & parent/guardian denies questions regarding infant care at time of discharge.  Parents  verbalized understanding to follow-up with the pediatrician or family physician as  recommended on the discharge instructions.  Mother verbalizes understanding to follow-up with baby’s care provider as instructed.  Discharged in stable  condition to care of parents. Infant placed in rear facing car seat per parents.  (support person) . I have informed the patient on when to call their healthcare provider and when to call 911. I have discussed with the patient  the importance of scheduling a follow-up visit with their physician, nurse practitioner or midwife and provided them with correct contact information for appointment. I have provided the patient with a copy of the \"Save Your Life\" handout. The patient has acknowledged receiving and understanding this education with her signature.

## 2023-01-30 NOTE — PROGRESS NOTES
CLINICAL PHARMACY NOTE: MEDS TO BEDS    Total # of Prescriptions Filled: 2   The following medications were delivered to the patient:  Motrin 600mg  Docusate 100mg    Additional Documentation: medications delivered to the pt and 2 visitors in room 749 on 01.28.23 at 12:20, co pay $12 paid with a card on the [employer]

## 2023-02-20 ENCOUNTER — E-VISIT (OUTPATIENT)
Dept: PRIMARY CARE CLINIC | Age: 33
End: 2023-02-20
Payer: COMMERCIAL

## 2023-02-20 DIAGNOSIS — J06.9 VIRAL UPPER RESPIRATORY TRACT INFECTION: Primary | ICD-10-CM

## 2023-02-20 PROCEDURE — 99422 OL DIG E/M SVC 11-20 MIN: CPT | Performed by: NURSE PRACTITIONER

## 2023-02-20 ASSESSMENT — LIFESTYLE VARIABLES: SMOKING_STATUS: NO, I'VE NEVER SMOKED

## 2023-02-20 NOTE — PROGRESS NOTES
Nelly Skiff (1990) initiated an asynchronous digital communication through [employer]. HPI: per patient questionnaire     Exam: not applicable    Diagnoses and all orders for this visit:  Diagnoses and all orders for this visit:    Viral upper respiratory tract infection    Symptom started 2 days ago. Likely viral in nature. Recommend supportive care measures for 7 to 10 days before starting antibiotics. Supportive care measures provided. Recommend follow-up PCP    Time: EV2 - 11-20 minutes were spent on the digital evaluation and management of this patient.  15 min     SONIA Nichoel - CNP

## 2024-06-06 ENCOUNTER — HOSPITAL ENCOUNTER (OUTPATIENT)
Dept: PHYSICAL THERAPY | Facility: CLINIC | Age: 34
Setting detail: THERAPIES SERIES
Discharge: HOME | End: 2024-06-06
Payer: COMMERCIAL

## 2024-06-06 PROCEDURE — 97161 PT EVAL LOW COMPLEX 20 MIN: CPT

## 2024-06-06 PROCEDURE — 97530 THERAPEUTIC ACTIVITIES: CPT

## 2024-06-06 PROCEDURE — 97110 THERAPEUTIC EXERCISES: CPT

## 2024-06-06 NOTE — CONSULTS
[] [facility]  Outpatient Rehabilitation &  Therapy  [address]  [phone]  F: [phone] [x] [facility]  Outpatient Rehabilitation &  Therapy  [address]   [address]  P: [phone]  F: [phone] [] [facility]  Outpatient Rehabilitation &  Therapy  [address]  P: [phone]  F: [phone] [] [facility]  Outpatient Rehabilitation &  Therapy  [address]  P: [phone]  F: [phone] [] [facility]  Outpatient Rehabilitation &  Therapy  [address]   [address]   P: [phone]  F: [phone]      Physical Therapy Pelvic Floor Evaluation    Date: 24   Patient: Mell Stiles    : 1990 MRN: [id number]  Physician: Lorna Garner     Insurance: 1.[employer]: 60vs calyr,HardMax,PTOTSTcombined 2.[employer] follows primary   Medical Diagnosis: N39.3 (ICD-10-CM) - Stress incontinence of urine  Rehab Codes: R27.8, R29.3, M62.81, N39.46, M99.05, M62.08  Onset Date: 24 - date of script  Next 's appt.: unknown    Subjective:   CC/HPI: Pt is a 35 yo  female who presents with complaints of stress / urge urinary incontinence & PF weakness. This limits her ability to perform: ADLs/physical activity d/t fear BONG with urgency/laughing/coughing/sneezing/running.     Pt is ~15mos postpartum (PP). Pt's daughters are 3yo (emiliano) & 1.6 yo (elenita). She is currently nursing at night. Pt endorses 2nd degree perineal tear with 1st delivery; 2nd child was 9.5 lbs, but no tearing. Pt reports she delivered 23. Pt reports she went running too soon PP with full bladder loss. She notes she is not a hard core runner, but would like to be able to jog/run short distances for cardio. This is what triggered her to contact PCP for PFPT script.     Pt states BONG with laughing/coughing/sneezing/running; has to squeeze legs very tightly which tends to help with leakage

## 2024-06-25 ENCOUNTER — HOSPITAL ENCOUNTER (OUTPATIENT)
Dept: PHYSICAL THERAPY | Facility: CLINIC | Age: 34
Setting detail: THERAPIES SERIES
Discharge: HOME | End: 2024-06-25
Payer: COMMERCIAL

## 2024-06-25 PROCEDURE — 97112 NEUROMUSCULAR REEDUCATION: CPT

## 2024-06-25 PROCEDURE — 97110 THERAPEUTIC EXERCISES: CPT

## 2024-06-25 NOTE — FLOWSHEET NOTE
mile without leakage to indicate improved PF/urogenital/bladder function     Patient goals:  \"reduce sxs\"    Pt. Education:  [x] Yes  [] No  [x] Reviewed Prior HEP/Ed  Method of Education: [x] Verbal  [x] Demo  [x] Written  Access Code: [email or web address]  URL: [email or web address]  Date: 06/25/2024  Prepared by: Cassi Ramirez  Program Notes  Tiana oil for lube & vulvovaginal moistureperineal massage - 3x week 3-5 minkeep ribs over pelvis lumbar roll - use towel exhale with effortrest breaths between reps pelvic brace (exhale, core contraction & pelvic floor muscle lift)  Exercises  - Supine Diaphragmatic Breathing  - 1 x daily - 7 x weekly - 1 sets - 10 reps  - Pelvic Floor Elevators Hooklying with Pelvic Floor Lengthening  - 1 x daily - 7 x weekly - 1 sets - 10 reps - 4 hold  - Hooklying Hamstring Stretch with Strap  - 1 x daily - 3-5 x weekly - 1 sets - 1 reps - 60 hold  - Hooklying Clamshell with Resistance  - 1 x daily - 7 x weekly - 10 reps  - Bent Knee Fallouts  - 1 x daily - 7 x weekly - 1 sets - 10 reps  - Supine Bridge with Pelvic Floor Contraction  - 1 x daily - 7 x weekly - 10 reps  - Supine Piriformis Stretch with Foot on Ground  - 1 x daily - 3-5 x weekly - 1 sets - 1 reps - 60 hold  - Happy Baby with Pelvic Floor Lengthening  - 1 x daily - 7 x weekly - 1 reps - 1 minute hold  Patient Education  - Urinary Incontinence  - Get To Know Your Pelvic Floor- Female  Comprehension of Education:  [x] Verbalizes understanding.  [x] Demonstrates understanding.  [x] Needs review.  [x] Demonstrates/verbalizes HEP/Ed previously given.     Plan: [x] Continue current frequency toward long and short term goals.    [x] Specific Instructions for subsequent treatments: HEP review, educate on urinary urge strategies, caution with strengthening d/t difficulty releasing MVC use of BFB/neuro digital/internal sensor for PFM excursion, progression of pelvic brace & pressure managament in various positions       Time In: 3:03pm

## 2024-07-01 ENCOUNTER — HOSPITAL ENCOUNTER (OUTPATIENT)
Dept: PHYSICAL THERAPY | Facility: CLINIC | Age: 34
Setting detail: THERAPIES SERIES
Discharge: HOME | End: 2024-07-01

## 2024-07-01 NOTE — FLOWSHEET NOTE
[] [facility]  Outpatient Rehabilitation &  Therapy  [address]  [phone]  [phone] [x] [facility]  Outpatient Rehabilitation &  Therapy  [address]  P: [phone]  F: [phone] [] [facility]  Outpatient Rehabilitation &  Therapy  [address]  P: [phone]  F: [phone] [] [facility]  Outpatient Rehabilitation &  Therapy  [address]  [phone]  [phone] [] [facility]  Outpatient Rehabilitation &  Therapy  [address]   P: [phone]  F: [phone]  [] [facility]  Outpatient Rehabilitation &  Therapy  [address]  P: [phone]  F: [phone] [] [facility]  Outpatient Rehabilitation &  Therapy  [address]  P: [phone]  F: [phone] [] [facility]  Outpatient Rehabilitation &  Therapy  [address]  P: [phone]  F: [phone] [] [facility]  Outpatient Rehabilitation &  Therapy  [address]  P: [phone]  F: [phone]  [] [facility] Outpatient Rehabilitation &  Therapy  [address]  P: [phone]  F: [phone]     Therapy Cancel/No Show note    Date: 2024  Patient: Mell Stiles  : 1990  MRN: [id number]    Cancels/No Shows to date:     For today's appointment patient:    [x]  Cancelled    [] Rescheduled appointment    [] No-show     Reason given by patient:    []  Patient ill    []  Conflicting appointment    [] No transportation      [] Conflict with work    [x] No reason given    [] Weather related    [] COVID-19    [] Other:      Comments:        [x] Next appointment was confirmed    Electronically signed by: MONSTER DUMONT, PTA

## 2024-07-08 ENCOUNTER — HOSPITAL ENCOUNTER (OUTPATIENT)
Dept: PHYSICAL THERAPY | Facility: CLINIC | Age: 34
Setting detail: THERAPIES SERIES
Discharge: HOME | End: 2024-07-08
Payer: COMMERCIAL

## 2024-07-08 PROCEDURE — 97110 THERAPEUTIC EXERCISES: CPT

## 2024-07-08 NOTE — FLOWSHEET NOTE
[x] [facility]  Outpatient Rehabilitation &  Therapy  [address]  P: [phone]  F: [phone]     Physical Therapy Daily Treatment Note    Date:  2024  Patient Name:  Mell Stiles    :  1990  MRN: [id number]  Physician: Lorna Garner                     Insurance: 1.[employer]: 60vs calyr,HardMax,PTOTSTcombined 2.[employer] follows primary   Medical Diagnosis: N39.3 (ICD-10-CM) - Stress incontinence of urine  Rehab Codes: R27.8, R29.3, M62.81, N39.46, M99.05, M62.08  Onset Date: 24   - date of script  Next 's appt.: unknown  Visit# / total visits: 3/10    Cancels/No Shows: 1/0    Subjective:    Pain:  [x] Yes  [] No Location:  N/A  Pain Rating: (0-10 scale) 0/10  Pain altered Tx:  [x] No  [] Yes  Action:    Comments: Pt states that she went on a light run and jumped on the trampoline this week without instances. Pt feels this is progress compared to before starting PFPT. Pt reports feeling friction during intercourse. Reinforced importance of proper lubrication.    Objective:  Modalities:   Precautions:   Exercises: [employer] Access Code: [email or web address]  KT= Kinesiotape  PB= pelvic brace (DB-exhale+ TA contraction + kegel)  DB= diaphragmatic breathing  Exercise Reps/ Time Weight/ Level Comments   Pelvic model explanation & education  x    PF function  3 layers  Analogies - IAP (core cannister, juice box, trampoline)  Diaphragm & pelvic floor relationship (visual aid)     Tiana oil for lube & vulvovaginal moisture  perineal massage - 3x week 3-5min  keep ribs over pelvis   lumbar roll - use towel     exhale with effort  rest breaths between reps   pelvic brace (exhale, core contraction & pelvic floor muscle lift)               Supine            Diaphragmatic breathing  2'       Pelvic Floor Elevators Hooklying with Pelvic Floor Lengthening  10x5\"   Step up technique   Increased hold time 7/8   Hooklying Hamstring Stretch with Strap  1' ea       Hip abd

## 2024-07-15 ENCOUNTER — HOSPITAL ENCOUNTER (OUTPATIENT)
Dept: PHYSICAL THERAPY | Facility: CLINIC | Age: 34
Setting detail: THERAPIES SERIES
Discharge: HOME | End: 2024-07-15
Payer: COMMERCIAL

## 2024-07-15 PROCEDURE — 97110 THERAPEUTIC EXERCISES: CPT

## 2024-07-15 NOTE — FLOWSHEET NOTE
reps - 5 seconds hold  - Hooklying Hamstring Stretch with Strap  - 1 x daily - 3-5 x weekly - 1 sets - 1 reps - 60 hold  - Hooklying Clamshell with Resistance  - 1 x daily - 7 x weekly - 10 reps  - Bent Knee Fallouts  - 1 x daily - 7 x weekly - 1 sets - 10 reps  - Supine Bridge with Pelvic Floor Contraction  - 1 x daily - 7 x weekly - 10 reps  - Supine Piriformis Stretch with Foot on Ground  - 1 x daily - 3-5 x weekly - 1 sets - 1 reps - 60 hold  - Happy Baby with Pelvic Floor Lengthening  - 1 x daily - 7 x weekly - 1 reps - 1 minute hold  - Bridge with Heels on Swiss Ball  - 1 x daily - 7 x weekly - 10 reps  - Figure 4 Bridge  - 1 x daily - 7 x weekly - 10 reps  - Single Leg Running Balance  - 1 x daily - 7 x weekly - 10 reps  - Sit to Stand with Pelvic Floor Contraction  - 1 x daily - 7 x weekly - 10 reps  - Single Leg Sit to Stand with Arms Crossed  - 1 x daily - 7 x weekly - 5 reps  Patient Education  - Urinary Incontinence  - Get To Know Your Pelvic Floor- Female  Comprehension of Education:  [x] Verbalizes understanding.  [x] Demonstrates understanding.  [x] Needs review for progressions.  [x] Demonstrates/verbalizes HEP/Ed previously given.     Plan: [x] Continue current frequency toward long and short term goals.    [x] Specific Instructions for subsequent treatments: educate on urinary urge strategies, caution with strengthening d/t difficulty releasing MVC use of BFB/neuro digital/internal sensor for PFM excursion, progression of pelvic brace & pressure managament in various positions       Time In: 3:05pm            Time Out: 4:00pm    Electronically signed by:  MONSTER DUMONT PTA

## 2024-07-22 ENCOUNTER — HOSPITAL ENCOUNTER (OUTPATIENT)
Dept: PHYSICAL THERAPY | Facility: CLINIC | Age: 34
Setting detail: THERAPIES SERIES
Discharge: HOME | End: 2024-07-22
Payer: COMMERCIAL

## 2024-07-22 PROCEDURE — 97110 THERAPEUTIC EXERCISES: CPT

## 2024-07-22 NOTE — FLOWSHEET NOTE
[x] [facility]  Outpatient Rehabilitation &  Therapy  [address]  P: [phone]  F: [phone]     Physical Therapy Daily Treatment Note    Date:  2024  Patient Name:  Mell Stiles    :  1990  MRN: [id number]  Physician: Lorna Garner                     Insurance: 1.[employer]: 60vs calyr,HardMax,PTOTSTcombined 2.[employer] follows primary   Medical Diagnosis: N39.3 (ICD-10-CM) - Stress incontinence of urine  Rehab Codes: R27.8, R29.3, M62.81, N39.46, M99.05, M62.08  Onset Date: 24   - date of script  Next 's appt.: unknown  Visit# / total visits: 5/10    Cancels/No Shows: 1/0    Subjective:    Pain:  [x] Yes  [] No Location:  N/A  Pain Rating: (0-10 scale) 0/10  Pain altered Tx:  [x] No  [] Yes  Action:    Comments: Pt continues without leakage and has been active with running and with her children. Pt has not particpated in intercourse to note her tolerance. Pt states that she plans to use lube/coconut oil as she feels her discomfort is more friction related.     Objective:  Modalities:   Precautions:   Exercises: [employer] Access Code: [email or web address]  KT= Kinesiotape  PB= pelvic brace (DB-exhale+ TA contraction + kegel)  DB= diaphragmatic breathing  Exercise Reps/ Time Weight/ Level Comments   Pelvic model explanation & education      PF function  3 layers  Analogies - IAP (core cannister, juice box, trampoline)  Diaphragm & pelvic floor relationship (visual aid)     Tiana oil for lube & vulvovaginal moisture  perineal massage - 3x week 3-5min  keep ribs over pelvis   lumbar roll - use towel     exhale with effort  rest breaths between reps   pelvic brace (exhale, core contraction & pelvic floor muscle lift)   Treadmill 5' 2.0% incline  3.0 MPH  added 7/15   Supine            Diaphragmatic breathing         Pelvic Floor Elevators Hooklying with Pelvic Floor Lengthening  10x7\"   Step up technique   Increased hold time    Hooklying Hamstring

## 2024-08-06 ENCOUNTER — HOSPITAL ENCOUNTER (OUTPATIENT)
Dept: PHYSICAL THERAPY | Facility: CLINIC | Age: 34
Setting detail: THERAPIES SERIES
End: 2024-08-06
Payer: COMMERCIAL

## 2024-08-22 ENCOUNTER — HOSPITAL ENCOUNTER (OUTPATIENT)
Dept: PHYSICAL THERAPY | Facility: CLINIC | Age: 34
Setting detail: THERAPIES SERIES
Discharge: HOME | End: 2024-08-22
Payer: COMMERCIAL

## 2024-08-22 PROCEDURE — 97110 THERAPEUTIC EXERCISES: CPT

## 2024-08-22 PROCEDURE — 97112 NEUROMUSCULAR REEDUCATION: CPT

## 2024-08-22 NOTE — FLOWSHEET NOTE
[x] [facility]  Outpatient Rehabilitation &  Therapy  [address]  P: [phone]  F: [phone]     Physical Therapy Daily Treatment Note    Date:  2024  Patient Name:  Mell Stiles    :  1990  MRN: [id number]  Physician: Lorna Garner                     Insurance: 1.[employer]: 60vs calyr,HardMax,PTOTSTcombined 2.[employer] follows primary   Medical Diagnosis: N39.3 (ICD-10-CM) - Stress incontinence of urine  Rehab Codes: R27.8, R29.3, M62.81, N39.46, M99.05, M62.08  Onset Date: 24   - date of script  Next 's appt.: unknown  Visit# / total visits: 6/10    Cancels/No Shows: 1/0    Subjective:    Pain:  [x] Yes  [] No Location:  N/A  Pain Rating: (0-10 scale) 0/10  Pain altered Tx:  [x] No  [] Yes  Action:  Comments: Pt has not leakage in a long time. She had one instance of feeling on the verge of leakage but was able control by actively contract & relaxed and it didn't occur. She also jumped on the trampoline and did not leak.     Objective:  Modalities:   Precautions:   Exercises: [employer] Access Code: [email or web address]  KT= Kinesiotape  PB= pelvic brace (DB-exhale+ TA contraction + kegel)  DB= diaphragmatic breathing  Exercise Reps/ Time Weight/ Level Comments   Pelvic model explanation & education      PF function  3 layers  Analogies - IAP (core cannister, juice box, trampoline)  Diaphragm & pelvic floor relationship (visual aid)     Tiana oil for lube & vulvovaginal moisture  perineal massage - 3x week 3-5min  keep ribs over pelvis   lumbar roll - use towel     exhale with effort  rest breaths between reps   pelvic brace (exhale, core contraction & pelvic floor muscle lift)   Treadmill 3' 3.0% incline  3.0 MPH  added 7/15; inc incline    Supine            Diaphragmatic breathing         Pelvic Floor Elevators Hooklying with Pelvic Floor Lengthening  10x8\"   Step up technique   Increased hold time    Hooklying Hamstring Stretch with Strap  HEP

## 2024-09-24 ENCOUNTER — HOSPITAL ENCOUNTER (OUTPATIENT)
Dept: PHYSICAL THERAPY | Facility: CLINIC | Age: 34
Setting detail: THERAPIES SERIES
Discharge: HOME | End: 2024-09-24
Payer: COMMERCIAL

## 2024-09-24 PROCEDURE — 97110 THERAPEUTIC EXERCISES: CPT
